# Patient Record
Sex: FEMALE | Race: WHITE | Employment: PART TIME | URBAN - METROPOLITAN AREA
[De-identification: names, ages, dates, MRNs, and addresses within clinical notes are randomized per-mention and may not be internally consistent; named-entity substitution may affect disease eponyms.]

---

## 2018-12-14 DIAGNOSIS — R79.89 TSH ELEVATION: ICD-10-CM

## 2018-12-17 ENCOUNTER — TELEPHONE (OUTPATIENT)
Dept: INTERNAL MEDICINE | Age: 33
End: 2018-12-17

## 2018-12-17 DIAGNOSIS — E03.9 HYPOTHYROIDISM, UNSPECIFIED TYPE: Primary | ICD-10-CM

## 2018-12-17 RX ORDER — LEVOTHYROXINE SODIUM 25 MCG
25 TABLET ORAL DAILY
Qty: 90 TABLET | Refills: 0 | Status: SHIPPED | OUTPATIENT
Start: 2018-12-17 | End: 2019-03-17 | Stop reason: SDUPTHER

## 2019-01-04 ENCOUNTER — HOSPITAL ENCOUNTER (OUTPATIENT)
Age: 34
Discharge: HOME | End: 2019-01-04
Payer: COMMERCIAL

## 2019-01-04 DIAGNOSIS — E03.9 HYPOTHYROIDISM, UNSPECIFIED TYPE: ICD-10-CM

## 2019-01-04 LAB — TSH SERPL DL<=0.05 MIU/L-ACNC: 2.6 UIU/ML (ref 0.27–4.2)

## 2019-01-04 PROCEDURE — 84443 ASSAY THYROID STIM HORMONE: CPT

## 2019-01-04 PROCEDURE — 36415 COLL VENOUS BLD VENIPUNCTURE: CPT

## 2019-02-28 LAB — PAP SMEAR: NORMAL

## 2019-03-17 DIAGNOSIS — R79.89 TSH ELEVATION: ICD-10-CM

## 2019-03-19 RX ORDER — LEVOTHYROXINE SODIUM 25 MCG
TABLET ORAL
Qty: 14 TABLET | Refills: 0 | Status: SHIPPED | OUTPATIENT
Start: 2019-03-19 | End: 2019-04-01 | Stop reason: SDUPTHER

## 2019-04-01 ENCOUNTER — OFFICE VISIT (OUTPATIENT)
Dept: INTERNAL MEDICINE | Age: 34
End: 2019-04-01
Payer: COMMERCIAL

## 2019-04-01 VITALS
WEIGHT: 168 LBS | BODY MASS INDEX: 25.46 KG/M2 | DIASTOLIC BLOOD PRESSURE: 74 MMHG | SYSTOLIC BLOOD PRESSURE: 116 MMHG | RESPIRATION RATE: 12 BRPM | HEIGHT: 68 IN | TEMPERATURE: 97.4 F | HEART RATE: 106 BPM

## 2019-04-01 DIAGNOSIS — E03.9 HYPOTHYROIDISM, UNSPECIFIED TYPE: Primary | ICD-10-CM

## 2019-04-01 PROCEDURE — 99213 OFFICE O/P EST LOW 20 MIN: CPT | Performed by: INTERNAL MEDICINE

## 2019-04-01 PROCEDURE — 1036F TOBACCO NON-USER: CPT | Performed by: INTERNAL MEDICINE

## 2019-04-01 PROCEDURE — G8427 DOCREV CUR MEDS BY ELIG CLIN: HCPCS | Performed by: INTERNAL MEDICINE

## 2019-04-01 PROCEDURE — 99212 OFFICE O/P EST SF 10 MIN: CPT | Performed by: INTERNAL MEDICINE

## 2019-04-01 PROCEDURE — G8419 CALC BMI OUT NRM PARAM NOF/U: HCPCS | Performed by: INTERNAL MEDICINE

## 2019-04-01 RX ORDER — LEVOTHYROXINE SODIUM 25 MCG
TABLET ORAL
Qty: 90 TABLET | Refills: 3 | Status: SHIPPED
Start: 2019-04-01 | End: 2020-03-16 | Stop reason: SDUPTHER

## 2019-04-01 ASSESSMENT — ENCOUNTER SYMPTOMS
TROUBLE SWALLOWING: 0
SHORTNESS OF BREATH: 0
SORE THROAT: 0
NAUSEA: 0
VOMITING: 0
RHINORRHEA: 0
DIARRHEA: 0
CONSTIPATION: 0
EYE ITCHING: 1
CHEST TIGHTNESS: 0
ABDOMINAL PAIN: 0

## 2019-04-01 NOTE — PATIENT INSTRUCTIONS
Follow-up in 1 year and as needed. Patient Education        Learning About Sleeping Well  What does sleeping well mean? Sleeping well means getting enough sleep. How much sleep is enough varies among people. The number of hours you sleep is not as important as how you feel when you wake up. If you do not feel refreshed, you probably need more sleep. Another sign of not getting enough sleep is feeling tired during the day. The average total nightly sleep time is 7½ to 8 hours. Healthy adults may need a little more or a little less than this. Why is getting enough sleep important? Getting enough quality sleep is a basic part of good health. When your sleep suffers, your mood and your thoughts can suffer too. You may find yourself feeling more grumpy or stressed. Not getting enough sleep also can lead to serious problems, including injury, accidents, anxiety, and depression. What might cause poor sleeping? Many things can cause sleep problems, including:  · Stress. Stress can be caused by fear about a single event, such as giving a speech. Or you may have ongoing stress, such as worry about work or school. · Depression, anxiety, and other mental or emotional conditions. · Changes in your sleep habits or surroundings. This includes changes that happen where you sleep, such as noise, light, or sleeping in a different bed. It also includes changes in your sleep pattern, such as having jet lag or working a late shift. · Health problems, such as pain, breathing problems, and restless legs syndrome. · Lack of regular exercise. How can you help yourself? Here are some tips that may help you sleep more soundly and wake up feeling more refreshed. Your sleeping area  · Use your bedroom only for sleeping and sex. A bit of light reading may help you fall asleep. But if it doesn't, do your reading elsewhere in the house. Don't watch TV in bed.   · Be sure your bed is big enough to stretch out comfortably, especially if you have a sleep partner. · Keep your bedroom quiet, dark, and cool. Use curtains, blinds, or a sleep mask to block out light. To block out noise, use earplugs, soothing music, or a \"white noise\" machine. Your evening and bedtime routine  · Create a relaxing bedtime routine. You might want to take a warm shower or bath, listen to soothing music, or drink a cup of noncaffeinated tea. · Go to bed at the same time every night. And get up at the same time every morning, even if you feel tired. What to avoid  · Limit caffeine (coffee, tea, caffeinated sodas) during the day, and don't have any for at least 4 to 6 hours before bedtime. · Don't drink alcohol before bedtime. Alcohol can cause you to wake up more often during the night. · Don't smoke or use tobacco, especially in the evening. Nicotine can keep you awake. · Don't take naps during the day, especially close to bedtime. · Don't lie in bed awake for too long. If you can't fall asleep, or if you wake up in the middle of the night and can't get back to sleep within 15 minutes or so, get out of bed and go to another room until you feel sleepy. · Don't take medicine right before bed that may keep you awake or make you feel hyper or energized. Your doctor can tell you if your medicine may do this and if you can take it earlier in the day. If you can't sleep  · Imagine yourself in a peaceful, pleasant scene. Focus on the details and feelings of being in a place that is relaxing. · Get up and do a quiet or boring activity until you feel sleepy. · Don't drink any liquids after 6 p.m. if you wake up often because you have to go to the bathroom. Where can you learn more? Go to https://Financial Fairy Talescassie.Majitek. org and sign in to your [employer] account. Enter G587 in the Lamsa box to learn more about \"Learning About Sleeping Well. \"     If you do not have an account, please click on the \"Sign Up Now\" link.   Current as of: September 11, 2018  Content Version: 11.9  © 0788-2464 [employer]. Care instructions adapted under license by [facility] ([facility]). If you have questions about a medical condition or this instruction, always ask your healthcare professional. [address] any warranty or liability for your use of this information.

## 2020-03-16 RX ORDER — LEVOTHYROXINE SODIUM 25 MCG
TABLET ORAL
Qty: 90 TABLET | Refills: 3 | Status: SHIPPED
Start: 2020-03-16 | End: 2021-03-11

## 2020-03-17 ENCOUNTER — TELEPHONE (OUTPATIENT)
Dept: INTERNAL MEDICINE | Age: 35
End: 2020-03-17

## 2020-10-20 ENCOUNTER — OFFICE VISIT (OUTPATIENT)
Dept: INTERNAL MEDICINE | Age: 35
End: 2020-10-20
Payer: COMMERCIAL

## 2020-10-20 VITALS
RESPIRATION RATE: 12 BRPM | BODY MASS INDEX: 22.43 KG/M2 | WEIGHT: 148 LBS | HEART RATE: 104 BPM | SYSTOLIC BLOOD PRESSURE: 116 MMHG | HEIGHT: 68 IN | TEMPERATURE: 98 F | DIASTOLIC BLOOD PRESSURE: 77 MMHG

## 2020-10-20 PROCEDURE — 6360000002 HC RX W HCPCS

## 2020-10-20 PROCEDURE — G8420 CALC BMI NORM PARAMETERS: HCPCS | Performed by: INTERNAL MEDICINE

## 2020-10-20 PROCEDURE — G8482 FLU IMMUNIZE ORDER/ADMIN: HCPCS | Performed by: INTERNAL MEDICINE

## 2020-10-20 PROCEDURE — G0008 ADMIN INFLUENZA VIRUS VAC: HCPCS

## 2020-10-20 PROCEDURE — G8427 DOCREV CUR MEDS BY ELIG CLIN: HCPCS | Performed by: INTERNAL MEDICINE

## 2020-10-20 PROCEDURE — 90686 IIV4 VACC NO PRSV 0.5 ML IM: CPT

## 2020-10-20 PROCEDURE — 1036F TOBACCO NON-USER: CPT | Performed by: INTERNAL MEDICINE

## 2020-10-20 PROCEDURE — 99212 OFFICE O/P EST SF 10 MIN: CPT | Performed by: INTERNAL MEDICINE

## 2020-10-20 PROCEDURE — 99213 OFFICE O/P EST LOW 20 MIN: CPT | Performed by: INTERNAL MEDICINE

## 2020-10-20 RX ORDER — MULTIVIT-MIN/IRON/FOLIC ACID/K 18-600-40
CAPSULE ORAL
COMMUNITY

## 2020-10-20 RX ORDER — LORATADINE 10 MG/1
10 TABLET ORAL DAILY
COMMUNITY

## 2020-10-20 SDOH — ECONOMIC STABILITY: FOOD INSECURITY: WITHIN THE PAST 12 MONTHS, THE FOOD YOU BOUGHT JUST DIDN'T LAST AND YOU DIDN'T HAVE MONEY TO GET MORE.: NEVER TRUE

## 2020-10-20 SDOH — ECONOMIC STABILITY: TRANSPORTATION INSECURITY
IN THE PAST 12 MONTHS, HAS THE LACK OF TRANSPORTATION KEPT YOU FROM MEDICAL APPOINTMENTS OR FROM GETTING MEDICATIONS?: YES

## 2020-10-20 SDOH — ECONOMIC STABILITY: TRANSPORTATION INSECURITY
IN THE PAST 12 MONTHS, HAS LACK OF TRANSPORTATION KEPT YOU FROM MEETINGS, WORK, OR FROM GETTING THINGS NEEDED FOR DAILY LIVING?: YES

## 2020-10-20 SDOH — ECONOMIC STABILITY: FOOD INSECURITY: WITHIN THE PAST 12 MONTHS, YOU WORRIED THAT YOUR FOOD WOULD RUN OUT BEFORE YOU GOT MONEY TO BUY MORE.: NEVER TRUE

## 2020-10-20 SDOH — ECONOMIC STABILITY: INCOME INSECURITY: HOW HARD IS IT FOR YOU TO PAY FOR THE VERY BASICS LIKE FOOD, HOUSING, MEDICAL CARE, AND HEATING?: NOT HARD AT ALL

## 2020-10-20 ASSESSMENT — ENCOUNTER SYMPTOMS
SINUS PRESSURE: 0
SORE THROAT: 0
ABDOMINAL PAIN: 0
CONSTIPATION: 0
COUGH: 0
TROUBLE SWALLOWING: 0
VOMITING: 0
CHEST TIGHTNESS: 0
SHORTNESS OF BREATH: 0
BLOOD IN STOOL: 0
DIARRHEA: 0
SINUS PAIN: 0
NAUSEA: 0

## 2020-10-20 ASSESSMENT — PATIENT HEALTH QUESTIONNAIRE - PHQ9
SUM OF ALL RESPONSES TO PHQ QUESTIONS 1-9: 0
SUM OF ALL RESPONSES TO PHQ QUESTIONS 1-9: 0
SUM OF ALL RESPONSES TO PHQ9 QUESTIONS 1 & 2: 0
SUM OF ALL RESPONSES TO PHQ QUESTIONS 1-9: 0
2. FEELING DOWN, DEPRESSED OR HOPELESS: 0
1. LITTLE INTEREST OR PLEASURE IN DOING THINGS: 0

## 2020-10-20 NOTE — PATIENT INSTRUCTIONS
Patient Education        Learning About Calcium  What is calcium? Calcium keeps your bones and muscles--including your heart--healthy and strong. Your body needs vitamin D to absorb calcium. People who don't get enough calcium and vitamin D throughout life have an increased chance of having thin and brittle bones (osteoporosis) in their later years. Thin and brittle bones break easily. They can lead to serious injuries. This is why it's important for you to get enough calcium and vitamin D as a child and as an adult. It helps keep your bones strong as you get older. And it protects you against possible breaks. Your body also uses vitamin D to help your muscles absorb calcium and work well. If your muscles don't get enough calcium, then they can cramp, hurt, or feel weak. You may have long-term (chronic) muscle aches and pains. How much calcium do you need? How much calcium you need each day changes as you age. The [place] of Medicine recommends the following amounts of calcium each day. · Ages 1 to 3 years: 700 milligrams  · Ages 4 to 8 years: 1,000 milligrams  · Ages 5 to 25 years: 1,300 milligrams  · Ages 23 to 48 years: 1,000 milligrams  · Males 46 to 79 years: 1,000 milligrams  · Females 46 to 79 years: 1,200 milligrams  · Ages 70 and older: 1,200 milligrams  Women who are pregnant or breastfeeding need the same amount of calcium and vitamin D as other women their age. How can you get enough calcium? Calcium is in foods such as milk, cheese, and yogurt. Vegetables like broccoli, kale, and Chinese cabbage also have it. You can get calcium if you eat the soft edible bones in canned sardines and canned salmon. Foods with added (fortified) calcium include some cereals, juices, soy drinks, and tofu. The food label will show how much of it was added. You can figure out how much calcium is in a food by looking at the percent daily value section on the nutrition facts label.  The food label assumes the daily value of calcium is 1,000 mg. So if one serving of a food has a daily value of 20% of calcium, that food has 200 mg of calcium in one serving. Some people who don't get enough calcium may need supplements. Two common calcium supplements are calcium citrate and calcium carbonate. Calcium carbonate is best absorbed when it is taken with food. Calcium citrate can be absorbed well with or without food. Spreading calcium out over the course of the day can reduce stomach upset. And your body absorbs it better when it is spread over the day. Try not to take more than 500 mg of calcium supplement at a time. Where can you learn more? Go to https://E Inkpepiceweb.Yattos. org and sign in to your [employer] account. Enter S264 in the Lingvist box to learn more about \"Learning About Calcium. \"     If you do not have an account, please click on the \"Sign Up Now\" link. Current as of: August 22, 2019               Content Version: 12.6  © 2006-2020 [employer]. Care instructions adapted under license by [facility] ([facility]). If you have questions about a medical condition or this instruction, always ask your healthcare professional. [address] any warranty or liability for your use of this information. Patient Education        Learning About Calcium  What is calcium? Calcium keeps your bones and muscles--including your heart--healthy and strong. Your body needs vitamin D to absorb calcium. People who don't get enough calcium and vitamin D throughout life have an increased chance of having thin and brittle bones (osteoporosis) in their later years. Thin and brittle bones break easily. They can lead to serious injuries. This is why it's important for you to get enough calcium and vitamin D as a child and as an adult. It helps keep your bones strong as you get older. And it protects you against possible breaks.   Your body also uses vitamin D to help your muscles absorb calcium and work well. If your muscles don't get enough calcium, then they can cramp, hurt, or feel weak. You may have long-term (chronic) muscle aches and pains. How much calcium do you need? How much calcium you need each day changes as you age. The [place] of Medicine recommends the following amounts of calcium each day. · Ages 1 to 3 years: 700 milligrams  · Ages 4 to 8 years: 1,000 milligrams  · Ages 5 to 25 years: 1,300 milligrams  · Ages 23 to 48 years: 1,000 milligrams  · Males 46 to 79 years: 1,000 milligrams  · Females 46 to 79 years: 1,200 milligrams  · Ages 70 and older: 1,200 milligrams  Women who are pregnant or breastfeeding need the same amount of calcium and vitamin D as other women their age. How can you get enough calcium? Calcium is in foods such as milk, cheese, and yogurt. Vegetables like broccoli, kale, and Chinese cabbage also have it. You can get calcium if you eat the soft edible bones in canned sardines and canned salmon. Foods with added (fortified) calcium include some cereals, juices, soy drinks, and tofu. The food label will show how much of it was added. You can figure out how much calcium is in a food by looking at the percent daily value section on the nutrition facts label. The food label assumes the daily value of calcium is 1,000 mg. So if one serving of a food has a daily value of 20% of calcium, that food has 200 mg of calcium in one serving. Some people who don't get enough calcium may need supplements. Two common calcium supplements are calcium citrate and calcium carbonate. Calcium carbonate is best absorbed when it is taken with food. Calcium citrate can be absorbed well with or without food. Spreading calcium out over the course of the day can reduce stomach upset. And your body absorbs it better when it is spread over the day. Try not to take more than 500 mg of calcium supplement at a time. Where can you learn more?   Go to https://[email or web address].[email or web address] [email or web address] org and sign in to your [employer] account. Enter S264 in the madvertise box to learn more about \"Learning About Calcium. \"     If you do not have an account, please click on the \"Sign Up Now\" link. Current as of: August 22, 2019               Content Version: 12.6  © 2635-4291 [employer]. Care instructions adapted under license by [facility] ([facility]). If you have questions about a medical condition or this instruction, always ask your healthcare professional. [address] any warranty or liability for your use of this information.

## 2020-10-20 NOTE — PROGRESS NOTES
Discharge instructions reviewed with patient. Patient verbalizes understanding. AVS given. Julia Hirsch
achieve 1500 mg daily and 800 IU of Vitamin D. Health maintenance -   -     INFLUENZA, QUADV, 3 YRS AND OLDER, IM PF, PREFILL SYR OR SDV, 0.5ML (AFLURIA QUADV, PF)    Need pap exam record - Dr. Danie Childs.       Tato Miles MD  10/21/2020

## 2020-10-27 ENCOUNTER — HOSPITAL ENCOUNTER (OUTPATIENT)
Age: 35
Discharge: HOME | End: 2020-10-29
Payer: COMMERCIAL

## 2020-10-27 LAB
TSH SERPL DL<=0.05 MIU/L-ACNC: 3.31 UIU/ML (ref 0.27–4.2)
VITAMIN D 25-HYDROXY: 32 NG/ML (ref 30–100)

## 2020-10-27 PROCEDURE — 84443 ASSAY THYROID STIM HORMONE: CPT

## 2020-10-27 PROCEDURE — 36415 COLL VENOUS BLD VENIPUNCTURE: CPT

## 2020-10-27 PROCEDURE — 82306 VITAMIN D 25 HYDROXY: CPT

## 2021-01-30 ENCOUNTER — IMMUNIZATION (OUTPATIENT)
Dept: PRIMARY CARE CLINIC | Age: 36
End: 2021-01-30
Payer: COMMERCIAL

## 2021-01-30 DIAGNOSIS — Z23 NEED FOR PROPHYLACTIC VACCINATION AND INOCULATION AGAINST CHOLERA ALONE: Primary | ICD-10-CM

## 2021-01-30 PROCEDURE — 0001A COVID-19, PFIZER VACCINE 30MCG/0.3ML DOSE: CPT | Performed by: NURSE PRACTITIONER

## 2021-01-30 PROCEDURE — 91300 COVID-19, PFIZER VACCINE 30MCG/0.3ML DOSE: CPT | Performed by: NURSE PRACTITIONER

## 2021-02-22 ENCOUNTER — IMMUNIZATION (OUTPATIENT)
Dept: PRIMARY CARE CLINIC | Age: 36
End: 2021-02-22
Payer: COMMERCIAL

## 2021-02-22 PROCEDURE — 0002A COVID-19, PFIZER VACCINE 30MCG/0.3ML DOSE: CPT | Performed by: NURSE PRACTITIONER

## 2021-02-22 PROCEDURE — 91300 COVID-19, PFIZER VACCINE 30MCG/0.3ML DOSE: CPT | Performed by: NURSE PRACTITIONER

## 2021-03-11 DIAGNOSIS — E03.9 HYPOTHYROIDISM, UNSPECIFIED TYPE: ICD-10-CM

## 2021-03-11 RX ORDER — LEVOTHYROXINE SODIUM 25 MCG
TABLET ORAL
Qty: 90 TABLET | Refills: 3 | Status: SHIPPED
Start: 2021-03-11 | End: 2022-03-07

## 2022-03-07 DIAGNOSIS — E03.9 HYPOTHYROIDISM, UNSPECIFIED TYPE: ICD-10-CM

## 2022-03-07 RX ORDER — LEVOTHYROXINE SODIUM 25 MCG
TABLET ORAL
Qty: 90 TABLET | Refills: 3 | Status: SHIPPED
Start: 2022-03-07 | End: 2022-03-18 | Stop reason: CLARIF

## 2022-03-18 ENCOUNTER — TELEPHONE (OUTPATIENT)
Dept: INTERNAL MEDICINE | Age: 37
End: 2022-03-18

## 2022-03-18 RX ORDER — LEVOTHYROXINE SODIUM 0.03 MG/1
25 TABLET ORAL DAILY
Qty: 90 TABLET | Refills: 1 | Status: SHIPPED
Start: 2022-03-18 | End: 2022-09-19 | Stop reason: SDUPTHER

## 2022-09-02 RX ORDER — LEVOTHYROXINE SODIUM 0.03 MG/1
TABLET ORAL
Qty: 90 TABLET | Refills: 3 | OUTPATIENT
Start: 2022-09-02

## 2022-09-09 NOTE — PROGRESS NOTES
Care gaps reviewed with patient.  The following information was ascertained:  Cervical Cancer Screenin19  Breast Cancer Screening: age 34  Colon Cancer Screening: age 37  Annual Wellness Visit: NA  Lung Cancer Screening: NA  DEXA: NA  Diabetic Foot Exam: NA  Eye Exam:   Dental Exam:   Labs: Alejandrina, HIV, Hep C  TDaP: 14  Pneumonia: NA  Shingrix: age 46  Covid: booster #1 due  Influenza: due  Hep A: NA  Hep B: NA  Edwar Pradhan LPN

## 2022-09-14 ENCOUNTER — OFFICE VISIT (OUTPATIENT)
Dept: INTERNAL MEDICINE | Age: 37
End: 2022-09-14
Payer: COMMERCIAL

## 2022-09-14 VITALS
WEIGHT: 157 LBS | BODY MASS INDEX: 23.79 KG/M2 | DIASTOLIC BLOOD PRESSURE: 68 MMHG | HEART RATE: 61 BPM | HEIGHT: 68 IN | SYSTOLIC BLOOD PRESSURE: 103 MMHG | RESPIRATION RATE: 18 BRPM | OXYGEN SATURATION: 100 % | TEMPERATURE: 97.3 F

## 2022-09-14 DIAGNOSIS — Z91.89 ENCOUNTER FOR HCV SCREENING TEST FOR HIGH RISK PATIENT: ICD-10-CM

## 2022-09-14 DIAGNOSIS — Z13.0 SCREENING FOR ENDOCRINE, METABOLIC AND IMMUNITY DISORDER: ICD-10-CM

## 2022-09-14 DIAGNOSIS — E03.9 HYPOTHYROIDISM, UNSPECIFIED TYPE: Primary | ICD-10-CM

## 2022-09-14 DIAGNOSIS — Z13.29 SCREENING FOR ENDOCRINE, METABOLIC AND IMMUNITY DISORDER: ICD-10-CM

## 2022-09-14 DIAGNOSIS — Z13.220 LIPID SCREENING: ICD-10-CM

## 2022-09-14 DIAGNOSIS — Z11.59 ENCOUNTER FOR HCV SCREENING TEST FOR HIGH RISK PATIENT: ICD-10-CM

## 2022-09-14 DIAGNOSIS — Z13.228 SCREENING FOR ENDOCRINE, METABOLIC AND IMMUNITY DISORDER: ICD-10-CM

## 2022-09-14 DIAGNOSIS — Z23 INFLUENZA VACCINE NEEDED: ICD-10-CM

## 2022-09-14 DIAGNOSIS — Z11.4 SCREENING FOR HUMAN IMMUNODEFICIENCY VIRUS: ICD-10-CM

## 2022-09-14 PROCEDURE — 99213 OFFICE O/P EST LOW 20 MIN: CPT | Performed by: INTERNAL MEDICINE

## 2022-09-14 SDOH — ECONOMIC STABILITY: HOUSING INSECURITY: IN THE LAST 12 MONTHS, HOW MANY PLACES HAVE YOU LIVED?: 1

## 2022-09-14 SDOH — ECONOMIC STABILITY: HOUSING INSECURITY
IN THE LAST 12 MONTHS, WAS THERE A TIME WHEN YOU DID NOT HAVE A STEADY PLACE TO SLEEP OR SLEPT IN A SHELTER (INCLUDING NOW)?: NO

## 2022-09-14 SDOH — ECONOMIC STABILITY: TRANSPORTATION INSECURITY
IN THE PAST 12 MONTHS, HAS THE LACK OF TRANSPORTATION KEPT YOU FROM MEDICAL APPOINTMENTS OR FROM GETTING MEDICATIONS?: NO

## 2022-09-14 SDOH — ECONOMIC STABILITY: TRANSPORTATION INSECURITY
IN THE PAST 12 MONTHS, HAS LACK OF TRANSPORTATION KEPT YOU FROM MEETINGS, WORK, OR FROM GETTING THINGS NEEDED FOR DAILY LIVING?: NO

## 2022-09-14 SDOH — ECONOMIC STABILITY: FOOD INSECURITY: WITHIN THE PAST 12 MONTHS, THE FOOD YOU BOUGHT JUST DIDN'T LAST AND YOU DIDN'T HAVE MONEY TO GET MORE.: NEVER TRUE

## 2022-09-14 SDOH — ECONOMIC STABILITY: INCOME INSECURITY: IN THE LAST 12 MONTHS, WAS THERE A TIME WHEN YOU WERE NOT ABLE TO PAY THE MORTGAGE OR RENT ON TIME?: NO

## 2022-09-14 SDOH — ECONOMIC STABILITY: FOOD INSECURITY: WITHIN THE PAST 12 MONTHS, YOU WORRIED THAT YOUR FOOD WOULD RUN OUT BEFORE YOU GOT MONEY TO BUY MORE.: NEVER TRUE

## 2022-09-14 ASSESSMENT — PATIENT HEALTH QUESTIONNAIRE - PHQ9
SUM OF ALL RESPONSES TO PHQ QUESTIONS 1-9: 0
SUM OF ALL RESPONSES TO PHQ QUESTIONS 1-9: 0
2. FEELING DOWN, DEPRESSED OR HOPELESS: 0
SUM OF ALL RESPONSES TO PHQ9 QUESTIONS 1 & 2: 0
1. LITTLE INTEREST OR PLEASURE IN DOING THINGS: NOT AT ALL
SUM OF ALL RESPONSES TO PHQ QUESTIONS 1-9: 0
DEPRESSION UNABLE TO ASSESS: YES
2. FEELING DOWN, DEPRESSED OR HOPELESS: NOT AT ALL
1. LITTLE INTEREST OR PLEASURE IN DOING THINGS: 0
SUM OF ALL RESPONSES TO PHQ QUESTIONS 1-9: 0
SUM OF ALL RESPONSES TO PHQ9 QUESTIONS 1 & 2: 0

## 2022-09-14 ASSESSMENT — SOCIAL DETERMINANTS OF HEALTH (SDOH): HOW HARD IS IT FOR YOU TO PAY FOR THE VERY BASICS LIKE FOOD, HOUSING, MEDICAL CARE, AND HEATING?: NOT HARD AT ALL

## 2022-09-14 ASSESSMENT — LIFESTYLE VARIABLES
HOW MANY STANDARD DRINKS CONTAINING ALCOHOL DO YOU HAVE ON A TYPICAL DAY: 1 OR 2
HOW OFTEN DO YOU HAVE A DRINK CONTAINING ALCOHOL: MONTHLY OR LESS

## 2022-09-14 NOTE — PROGRESS NOTES
[facility] Internal Medicine      SUBJECTIVE:  Gabriela Dill (:  1985) is a 43 y.o. female here for evaluation of the following chief complaint(s):  Hypothyroidism  Hypothyroidism - on levothyroxine, 25 mcg daily. Feeling well without any temperature intolerance. Check TSH     Allergic rhinitis - taking claritin seasonally. Symptoms usually worse in the spring. Continue claritin on a PRN basis. Migraine headache - No headaches for over 2 years. Monitor for any recurrence. Health maintenance - lipid panel ordered. HIV/Hep C and Varicella titers drawn as well. Review of Systems as above    Current Outpatient Medications on File Prior to Visit   Medication Sig Dispense Refill    levothyroxine (SYNTHROID) 25 MCG tablet Take 1 tablet by mouth daily 90 tablet 1    loratadine (CLARITIN) 10 MG tablet Take 10 mg by mouth daily      Cholecalciferol (VITAMIN D) 50 MCG ( UT) CAPS capsule Take by mouth      loratadine-pseudoephedrine (CLARITIN-D 24 HOUR)  MG per tablet Take 1 tablet by mouth daily. No current facility-administered medications on file prior to visit. OBJECTIVE:    VS:   Vitals:    22 1319   BP: 103/68   Site: Left Upper Arm   Position: Sitting   Cuff Size: Medium Adult   Pulse: 61   Resp: 18   Temp: 97.3 °F (36.3 °C)   TempSrc: Infrared   SpO2: 100%   Weight: 157 lb (71.2 kg)   Height: 5' 8\" (1.727 m)     Physical Exam   HEENT:  PERRL, EOMI, Mouth without erythema or exudate. TMs clear B. Lungs:  CTA B, no vertebral column tenderness to palpation, no flank tenderness to percussion of flanks B. Neck:   No carotid bruits appreciated B. No LAD appreciated. No thyroid nodules or masses appreciated. CVS:  +s1/s2 without m/g/r appreciated. Abd:  + BS, NTND, No renal or aortic bruits, No hepatosplenomegaly appreciated.     Extr:  2+ DP/PT pulses B, no pitting edema      RTC:  Return in about 1 year (around

## 2022-09-15 DIAGNOSIS — Z13.220 LIPID SCREENING: ICD-10-CM

## 2022-09-15 DIAGNOSIS — Z11.4 SCREENING FOR HUMAN IMMUNODEFICIENCY VIRUS: ICD-10-CM

## 2022-09-15 DIAGNOSIS — E03.9 HYPOTHYROIDISM, UNSPECIFIED TYPE: ICD-10-CM

## 2022-09-15 DIAGNOSIS — Z91.89 ENCOUNTER FOR HCV SCREENING TEST FOR HIGH RISK PATIENT: ICD-10-CM

## 2022-09-15 DIAGNOSIS — Z13.29 SCREENING FOR ENDOCRINE, METABOLIC AND IMMUNITY DISORDER: ICD-10-CM

## 2022-09-15 DIAGNOSIS — Z11.59 ENCOUNTER FOR HCV SCREENING TEST FOR HIGH RISK PATIENT: ICD-10-CM

## 2022-09-15 DIAGNOSIS — Z13.228 SCREENING FOR ENDOCRINE, METABOLIC AND IMMUNITY DISORDER: ICD-10-CM

## 2022-09-15 DIAGNOSIS — Z13.0 SCREENING FOR ENDOCRINE, METABOLIC AND IMMUNITY DISORDER: ICD-10-CM

## 2022-09-15 LAB
CHOLESTEROL, TOTAL: 181 MG/DL (ref 0–199)
HDLC SERPL-MCNC: 61 MG/DL
LDL CHOLESTEROL CALCULATED: 99 MG/DL (ref 0–99)
TRIGL SERPL-MCNC: 103 MG/DL (ref 0–149)
TSH SERPL DL<=0.05 MIU/L-ACNC: 3.27 UIU/ML (ref 0.27–4.2)
VLDLC SERPL CALC-MCNC: 21 MG/DL

## 2022-09-18 LAB
HEPATITIS C ANTIBODY INTERPRETATION: NORMAL
HIV-1 AND HIV-2 ANTIBODIES: NORMAL

## 2022-09-19 LAB — VARICELLA-ZOSTER VIRUS AB, IGG: NORMAL

## 2022-09-19 RX ORDER — LEVOTHYROXINE SODIUM 0.03 MG/1
25 TABLET ORAL DAILY
Qty: 90 TABLET | Refills: 1 | Status: SHIPPED | OUTPATIENT
Start: 2022-09-19 | End: 2023-03-18

## 2023-03-01 RX ORDER — LEVOTHYROXINE SODIUM 0.03 MG/1
TABLET ORAL
Qty: 90 TABLET | Refills: 1 | Status: SHIPPED | OUTPATIENT
Start: 2023-03-01

## 2023-05-03 ENCOUNTER — OFFICE VISIT (OUTPATIENT)
Dept: INTERNAL MEDICINE | Age: 38
End: 2023-05-03
Payer: COMMERCIAL

## 2023-05-03 VITALS
BODY MASS INDEX: 23.76 KG/M2 | DIASTOLIC BLOOD PRESSURE: 76 MMHG | WEIGHT: 156.8 LBS | OXYGEN SATURATION: 96 % | HEART RATE: 88 BPM | SYSTOLIC BLOOD PRESSURE: 121 MMHG | HEIGHT: 68 IN | TEMPERATURE: 97.3 F | RESPIRATION RATE: 16 BRPM

## 2023-05-03 DIAGNOSIS — R19.7 DIARRHEA, UNSPECIFIED TYPE: ICD-10-CM

## 2023-05-03 DIAGNOSIS — R19.7 DIARRHEA, UNSPECIFIED TYPE: Primary | ICD-10-CM

## 2023-05-03 LAB
ALBUMIN SERPL-MCNC: 4.3 G/DL (ref 3.5–5.2)
ALP SERPL-CCNC: 54 U/L (ref 35–104)
ALT SERPL-CCNC: 20 U/L (ref 0–32)
ANION GAP SERPL CALCULATED.3IONS-SCNC: 13 MMOL/L (ref 7–16)
AST SERPL-CCNC: 24 U/L (ref 0–31)
BILIRUB SERPL-MCNC: <0.2 MG/DL (ref 0–1.2)
BUN SERPL-MCNC: 12 MG/DL (ref 6–20)
CALCIUM SERPL-MCNC: 9.5 MG/DL (ref 8.6–10.2)
CHLORIDE SERPL-SCNC: 108 MMOL/L (ref 98–107)
CO2 SERPL-SCNC: 22 MMOL/L (ref 22–29)
CREAT SERPL-MCNC: 0.7 MG/DL (ref 0.5–1)
GLUCOSE SERPL-MCNC: 89 MG/DL (ref 74–99)
POTASSIUM SERPL-SCNC: 4.2 MMOL/L (ref 3.5–5)
PROT SERPL-MCNC: 7.6 G/DL (ref 6.4–8.3)
SODIUM SERPL-SCNC: 143 MMOL/L (ref 132–146)
T4 FREE SERPL-MCNC: 1.32 NG/DL (ref 0.93–1.7)
TSH SERPL-MCNC: 1.5 UIU/ML (ref 0.27–4.2)

## 2023-05-03 PROCEDURE — 99212 OFFICE O/P EST SF 10 MIN: CPT | Performed by: INTERNAL MEDICINE

## 2023-05-03 PROCEDURE — 36415 COLL VENOUS BLD VENIPUNCTURE: CPT | Performed by: INTERNAL MEDICINE

## 2023-05-03 PROCEDURE — 99213 OFFICE O/P EST LOW 20 MIN: CPT | Performed by: INTERNAL MEDICINE

## 2023-05-03 NOTE — PROGRESS NOTES
[facility] Internal Medicine      SUBJECTIVE:  Pardeep Corley (:  1985) is a 42 y.o. female here for evaluation of the following chief complaint(s):  Diarrhea (Onset x 1 week ago  at times will have abd cramping between and after meals at times  denies nausea /vomiting)  Diarrhea - 10 days after a visit with young niece and nephew. Tried Pepto without relief. Then tried Friday immodium and this helped. Saturday afternoon had recurrence, nothing on . Monday, had return of stools. Sticking with a bland diet. Has a good appetite. Some cramping. No fevers or chills. Still with gallbladder. Started a new supplement - Mixers - iron/Mg. Started this 2 months ago. This helped her feel better. Uncertain if this is contributing to her symptoms. Check CMP, CBC, TSH, free T4 to see if this is contributing. Will research ingredients of this supplement to see if this is causative to these symptoms. Tru Schuster reports that she has been having increased stressors at work and has begun to feel that she is more anxious and down. Uncertain if she would like to start on medication therapy. Information given to Tru Schuster about escitalopram.    Tru Schuster to consider this information and decide on whether she would like to begin this medication therapy. Review of Systems as above. Current Outpatient Medications on File Prior to Visit   Medication Sig Dispense Refill    levothyroxine (SYNTHROID) 25 MCG tablet TAKE 1 TABLET DAILY 90 tablet 1    loratadine (CLARITIN) 10 MG tablet Take 1 tablet by mouth daily      Cholecalciferol (VITAMIN D) 50 MCG (2000) CAPS capsule Take by mouth       No current facility-administered medications on file prior to visit.        OBJECTIVE:    VS:   Vitals:    23 1405   BP: 121/76   Site: Right Upper Arm   Position: Sitting   Cuff Size: Large Adult   Pulse: 88   Resp: 16   Temp: 97.3 °F (36.3 °C)   TempSrc: Temporal   SpO2:

## 2023-05-04 ENCOUNTER — TELEPHONE (OUTPATIENT)
Dept: INTERNAL MEDICINE | Age: 38
End: 2023-05-04

## 2023-05-04 NOTE — TELEPHONE ENCOUNTER
BODØ completed the lab draw and did not draw a lavender tube. I will send to patient in mail for her to have drawn.   Fantasma Woods LPN

## 2023-05-19 LAB
BASOPHILS # BLD: 0.05 E9/L (ref 0–0.2)
BASOPHILS NFR BLD: 0.6 % (ref 0–2)
EOSINOPHIL # BLD: 0.26 E9/L (ref 0.05–0.5)
EOSINOPHIL NFR BLD: 3.1 % (ref 0–6)
ERYTHROCYTE [DISTWIDTH] IN BLOOD BY AUTOMATED COUNT: 13.3 FL (ref 11.5–15)
HCT VFR BLD AUTO: 37.9 % (ref 34–48)
HGB BLD-MCNC: 11.9 G/DL (ref 11.5–15.5)
IMM GRANULOCYTES # BLD: 0.03 E9/L
IMM GRANULOCYTES NFR BLD: 0.4 % (ref 0–5)
LYMPHOCYTES # BLD: 1.76 E9/L (ref 1.5–4)
LYMPHOCYTES NFR BLD: 20.8 % (ref 20–42)
MCH RBC QN AUTO: 32.1 PG (ref 26–35)
MCHC RBC AUTO-ENTMCNC: 31.4 % (ref 32–34.5)
MCV RBC AUTO: 102.2 FL (ref 80–99.9)
MONOCYTES # BLD: 0.6 E9/L (ref 0.1–0.95)
MONOCYTES NFR BLD: 7.1 % (ref 2–12)
NEUTROPHILS # BLD: 5.77 E9/L (ref 1.8–7.3)
NEUTS SEG NFR BLD: 68 % (ref 43–80)
PLATELET # BLD AUTO: 248 E9/L (ref 130–450)
PMV BLD AUTO: 11.1 FL (ref 7–12)
RBC # BLD AUTO: 3.71 E12/L (ref 3.5–5.5)
WBC # BLD: 8.5 E9/L (ref 4.5–11.5)

## 2023-05-30 ASSESSMENT — PATIENT HEALTH QUESTIONNAIRE - PHQ9
1. LITTLE INTEREST OR PLEASURE IN DOING THINGS: 0
2. FEELING DOWN, DEPRESSED OR HOPELESS: 0
SUM OF ALL RESPONSES TO PHQ QUESTIONS 1-9: 0
SUM OF ALL RESPONSES TO PHQ9 QUESTIONS 1 & 2: 0
SUM OF ALL RESPONSES TO PHQ9 QUESTIONS 1 & 2: 0
1. LITTLE INTEREST OR PLEASURE IN DOING THINGS: NOT AT ALL
SUM OF ALL RESPONSES TO PHQ QUESTIONS 1-9: 0
SUM OF ALL RESPONSES TO PHQ QUESTIONS 1-9: 0
2. FEELING DOWN, DEPRESSED OR HOPELESS: NOT AT ALL
SUM OF ALL RESPONSES TO PHQ QUESTIONS 1-9: 0

## 2023-05-30 NOTE — PROGRESS NOTES
[facility] Internal Medicine      SUBJECTIVE:  Kim Looney (:  1985) is a 42 y.o. female here for evaluation of the following chief complaint(s):  Thyroid Problem  Diarrhea - has discontinued use of supplement and all symptoms have resolved. Increased anxiety and increased work stressors - discussed use of escitalopram and Palmer Becker was to consider this as a treatment if needed. Palmer Becker states that she is still experiencing anxiety and her TIAGO-7 is 9 indicating mild anxiety based on scoring. Start escitalopram, 5 mg daily    Reassess symptoms in 4 weeks to determine need for increased dose or to stay at current dose. Continue current counseling     Hypothyroidism - normal TSH on 5/3/2023. Continue levothyroxine, 25 mcg daily. Review of Systems    Current Outpatient Medications on File Prior to Visit   Medication Sig Dispense Refill    levothyroxine (SYNTHROID) 25 MCG tablet TAKE 1 TABLET DAILY 90 tablet 1    loratadine (CLARITIN) 10 MG tablet Take 1 tablet by mouth daily      Cholecalciferol (VITAMIN D) 50 MCG (2000 UT) CAPS capsule Take by mouth       No current facility-administered medications on file prior to visit. OBJECTIVE:    VS:   Vitals:    23 0856   BP: 112/73   Site: Left Upper Arm   Position: Sitting   Cuff Size: Medium Adult   Pulse: (!) 104   Resp: 18   Temp: 97.6 °F (36.4 °C)   TempSrc: Temporal   SpO2: 100%   Weight: 158 lb (71.7 kg)   Height: 5' 8\" (1.727 m)     Physical Exam   Lungs:  CTA B  Neck:   No carotid bruits appreciated B.   CVS:  +s1/s2 without m/g/r appreciated. Abd:  + BS, NTND, No renal or aortic bruits   Extr:  2+ DP/PT pulses B, no pitting edema     RTC:  Return in about 4 weeks (around 2023).       Jose Domínguez MD   2023 11:14 AM

## 2023-05-31 ENCOUNTER — OFFICE VISIT (OUTPATIENT)
Dept: INTERNAL MEDICINE | Age: 38
End: 2023-05-31
Payer: COMMERCIAL

## 2023-05-31 VITALS
OXYGEN SATURATION: 100 % | TEMPERATURE: 97.6 F | RESPIRATION RATE: 18 BRPM | HEIGHT: 68 IN | DIASTOLIC BLOOD PRESSURE: 73 MMHG | SYSTOLIC BLOOD PRESSURE: 112 MMHG | HEART RATE: 104 BPM | BODY MASS INDEX: 23.95 KG/M2 | WEIGHT: 158 LBS

## 2023-05-31 DIAGNOSIS — F41.9 ANXIETY: Primary | ICD-10-CM

## 2023-05-31 PROCEDURE — 99213 OFFICE O/P EST LOW 20 MIN: CPT | Performed by: INTERNAL MEDICINE

## 2023-05-31 PROCEDURE — 99212 OFFICE O/P EST SF 10 MIN: CPT | Performed by: INTERNAL MEDICINE

## 2023-05-31 RX ORDER — ESCITALOPRAM OXALATE 5 MG/1
5 TABLET ORAL DAILY
Qty: 30 TABLET | Refills: 1 | Status: SHIPPED | OUTPATIENT
Start: 2023-05-31

## 2023-05-31 ASSESSMENT — PATIENT HEALTH QUESTIONNAIRE - PHQ9
8. MOVING OR SPEAKING SO SLOWLY THAT OTHER PEOPLE COULD HAVE NOTICED. OR THE OPPOSITE, BEING SO FIGETY OR RESTLESS THAT YOU HAVE BEEN MOVING AROUND A LOT MORE THAN USUAL: 0
10. IF YOU CHECKED OFF ANY PROBLEMS, HOW DIFFICULT HAVE THESE PROBLEMS MADE IT FOR YOU TO DO YOUR WORK, TAKE CARE OF THINGS AT HOME, OR GET ALONG WITH OTHER PEOPLE: 1
SUM OF ALL RESPONSES TO PHQ QUESTIONS 1-9: 4
SUM OF ALL RESPONSES TO PHQ9 QUESTIONS 1 & 2: 0
9. THOUGHTS THAT YOU WOULD BE BETTER OFF DEAD, OR OF HURTING YOURSELF: 0
2. FEELING DOWN, DEPRESSED OR HOPELESS: 0
7. TROUBLE CONCENTRATING ON THINGS, SUCH AS READING THE NEWSPAPER OR WATCHING TELEVISION: 0
1. LITTLE INTEREST OR PLEASURE IN DOING THINGS: 0
SUM OF ALL RESPONSES TO PHQ QUESTIONS 1-9: 4
3. TROUBLE FALLING OR STAYING ASLEEP: 1
6. FEELING BAD ABOUT YOURSELF - OR THAT YOU ARE A FAILURE OR HAVE LET YOURSELF OR YOUR FAMILY DOWN: 1
5. POOR APPETITE OR OVEREATING: 1
4. FEELING TIRED OR HAVING LITTLE ENERGY: 1

## 2023-05-31 ASSESSMENT — ANXIETY QUESTIONNAIRES
GAD7 TOTAL SCORE: 9
3. WORRYING TOO MUCH ABOUT DIFFERENT THINGS: 2
4. TROUBLE RELAXING: 1
IF YOU CHECKED OFF ANY PROBLEMS ON THIS QUESTIONNAIRE, HOW DIFFICULT HAVE THESE PROBLEMS MADE IT FOR YOU TO DO YOUR WORK, TAKE CARE OF THINGS AT HOME, OR GET ALONG WITH OTHER PEOPLE: SOMEWHAT DIFFICULT
1. FEELING NERVOUS, ANXIOUS, OR ON EDGE: 2
7. FEELING AFRAID AS IF SOMETHING AWFUL MIGHT HAPPEN: 0
2. NOT BEING ABLE TO STOP OR CONTROL WORRYING: 2
5. BEING SO RESTLESS THAT IT IS HARD TO SIT STILL: 0
6. BECOMING EASILY ANNOYED OR IRRITABLE: 2

## 2023-07-05 ENCOUNTER — SCHEDULED TELEPHONE ENCOUNTER (OUTPATIENT)
Dept: INTERNAL MEDICINE | Age: 38
End: 2023-07-05

## 2023-07-05 DIAGNOSIS — F41.9 ANXIETY: ICD-10-CM

## 2023-07-05 PROCEDURE — 99999 PR OFFICE/OUTPT VISIT,PROCEDURE ONLY: CPT | Performed by: INTERNAL MEDICINE

## 2023-07-17 NOTE — PROGRESS NOTES
No care gaps noted today. Patient confirmed pharmacies.     Long term: [facility]  Short term: [facility] in [place], [place]
Sindi De La Paz is a 42 y.o. female evaluated via telephone on 7/5/2023 for Thyroid Problem    Doing well over all. Has noticed less anxiety. Feels that this is helping. Noticing some difficulty with climaxing on this medication. This is improving. The first few weeks felt she was foggy but now improved. With overall improvement, will continue current dose of escitalopram.  If no improvement in sexual function in the next 2 - 4 weeks, will need to change medication with one that has less sexual side effects. Documentation:  I communicated with the patient and/or health care decision maker about Anxiety. Details of this discussion including any medical advice provided: Follow- up for 4 weeks since the initiation of escitalopram, 5 mg daily. Total Time: minutes: <5 minutes (not billable)    Sindi De La Paz was evaluated through a synchronous (real-time) audio encounter. Patient identification was verified at the start of the visit. She (or guardian if applicable) is aware that this is a billable service, which includes applicable co-pays. This visit was conducted with the patient's (and/or legal guardian's) verbal consent. She has not had a related appointment within my department in the past 7 days or scheduled within the next 24 hours. The patient was located at Home: [address] [id number]. The provider was located at  (Appt Dept): [address] [place] [address].     Note: not billable if this call serves to triage the patient into an appointment for the relevant concern    Omari Alvarado MD  7/5/2023
I, Lisa Laguna DO,  performed the initial face to face bedside interview with this patient regarding history of present illness, review of symptoms and relevant past medical, social and family history.  I completed an independent physical examination.  I was the initial provider who evaluated this patient.   I personally saw the patient and performed a substantive portion of the visit including all aspects of the medical decision making.  The history, relevant review of systems, past medical and surgical history, medical decision making, and physical examination was documented by the scribe in my presence and I attest to the accuracy of the documentation.

## 2023-07-21 ENCOUNTER — TELEPHONE (OUTPATIENT)
Dept: INTERNAL MEDICINE | Age: 38
End: 2023-07-21

## 2023-07-21 RX ORDER — BUPROPION HYDROCHLORIDE 150 MG/1
150 TABLET ORAL EVERY MORNING
Qty: 30 TABLET | Refills: 0 | Status: SHIPPED | OUTPATIENT
Start: 2023-07-21

## 2023-07-21 RX ORDER — BUPROPION HYDROCHLORIDE 150 MG/1
150 TABLET ORAL EVERY MORNING
Qty: 90 TABLET | Refills: 1 | Status: SHIPPED
Start: 2023-07-21 | End: 2023-07-21 | Stop reason: SDUPTHER

## 2023-07-21 NOTE — PROGRESS NOTES
Hailey Flanagan reported that she was continuing to have sexual dysfunction while on escitalopram.  At this time, will discontinue escitalopram.  Will start bupropion 150 mg once daily for 3 days and if tolerating will increase to 150 mg twice daily. Hailey Flanagan is aware and we also discussed the option of adding bupropion to the escitalopram.  It was decided for ease of dosing to remain on only 1 medication. Therefore, the above strategy will be employed. Hailey Flanagan to keep me updated on how she tolerates the bupropion.   New medication sent to the pharmacy and escitalopram discontinued from the medication list.    Minerva Marquez MD  7/21/2023

## 2023-08-28 NOTE — TELEPHONE ENCOUNTER
Freddie Ibrahim relates that she has been experiencing ongoing decreased libido with use of escitalopram.  At this time, discussed options and decided on the use of Wellbutrin, 150 mg daily to see if this will assist with anxiety and improve libido. Prescription sent to pharmacy.      Alessandra Mcdonnell MD  8/28/2023

## 2023-08-29 RX ORDER — LEVOTHYROXINE SODIUM 0.03 MG/1
TABLET ORAL
Qty: 90 TABLET | Refills: 1 | Status: SHIPPED | OUTPATIENT
Start: 2023-08-29

## 2023-09-12 ENCOUNTER — OFFICE VISIT (OUTPATIENT)
Dept: INTERNAL MEDICINE | Age: 38
End: 2023-09-12
Payer: COMMERCIAL

## 2023-09-12 VITALS
TEMPERATURE: 97.4 F | OXYGEN SATURATION: 99 % | HEART RATE: 107 BPM | SYSTOLIC BLOOD PRESSURE: 120 MMHG | WEIGHT: 168 LBS | BODY MASS INDEX: 25.46 KG/M2 | DIASTOLIC BLOOD PRESSURE: 82 MMHG | RESPIRATION RATE: 18 BRPM | HEIGHT: 68 IN

## 2023-09-12 DIAGNOSIS — F41.9 ANXIETY: ICD-10-CM

## 2023-09-12 DIAGNOSIS — Z01.419 WELL WOMAN EXAM: Primary | ICD-10-CM

## 2023-09-12 DIAGNOSIS — D23.30 DERMAL NEVUS OF FACE: ICD-10-CM

## 2023-09-12 PROCEDURE — 99213 OFFICE O/P EST LOW 20 MIN: CPT | Performed by: INTERNAL MEDICINE

## 2023-09-12 PROCEDURE — 99212 OFFICE O/P EST SF 10 MIN: CPT | Performed by: INTERNAL MEDICINE

## 2023-09-12 RX ORDER — DULOXETIN HYDROCHLORIDE 30 MG/1
30 CAPSULE, DELAYED RELEASE ORAL DAILY
Qty: 30 CAPSULE | Refills: 0 | Status: SHIPPED | OUTPATIENT
Start: 2023-09-12

## 2023-09-12 RX ORDER — LEVOTHYROXINE SODIUM 0.03 MG/1
25 TABLET ORAL DAILY
Qty: 90 TABLET | Refills: 3 | Status: SHIPPED | OUTPATIENT
Start: 2023-09-12

## 2023-09-14 ENCOUNTER — OFFICE VISIT (OUTPATIENT)
Dept: SURGERY | Age: 38
End: 2023-09-14
Payer: COMMERCIAL

## 2023-09-14 VITALS
BODY MASS INDEX: 25.31 KG/M2 | DIASTOLIC BLOOD PRESSURE: 83 MMHG | OXYGEN SATURATION: 96 % | SYSTOLIC BLOOD PRESSURE: 124 MMHG | HEART RATE: 109 BPM | TEMPERATURE: 98.1 F | HEIGHT: 68 IN | WEIGHT: 167 LBS

## 2023-09-14 DIAGNOSIS — L98.9 SKIN LESION: Primary | ICD-10-CM

## 2023-09-14 PROCEDURE — 99202 OFFICE O/P NEW SF 15 MIN: CPT | Performed by: PHYSICIAN ASSISTANT

## 2023-09-14 RX ORDER — LORATADINE 10 MG/1
10 CAPSULE, LIQUID FILLED ORAL DAILY
COMMUNITY

## 2023-09-14 NOTE — PROGRESS NOTES
Alcohol/week: 0.0 standard drinks of alcohol     Comment: Socially    Drug use: No    Sexual activity: Yes     Partners: Male     Birth control/protection: Condom   Other Topics Concern    Not on file   Social History Narrative    Not on file     Social Determinants of Health     Financial Resource Strain: Low Risk  (9/14/2022)    Overall Financial Resource Strain (CARDIA)     Difficulty of Paying Living Expenses: Not hard at all   Food Insecurity: No Food Insecurity (9/14/2022)    Hunger Vital Sign     Worried About Running Out of Food in the Last Year: Never true     Ran Out of Food in the Last Year: Never true   Transportation Needs: No Transportation Needs (9/14/2022)    PRAPARE - Transportation     Lack of Transportation (Medical): No     Lack of Transportation (Non-Medical): No   Physical Activity: Not on file   Stress: Not on file   Social Connections: Not on file   Intimate Partner Violence: Not on file   Housing Stability: Low Risk  (9/14/2022)    Housing Stability Vital Sign     Unable to Pay for Housing in the Last Year: No     Number of Places Lived in the Last Year: 1     Unstable Housing in the Last Year: No     Family History:   Family History   Problem Relation Age of Onset    Stroke Mother 52       REVIEW OF SYSTEMS:    CONSTITUTIONAL:  negative for  fevers, chills, sweats and fatigue  EYES: negative for dipolpia or acute vision loss. RESPIRATORY:  negative for  dry cough, cough with sputum, dyspnea, wheezing and chest pain  HENT:negative for pain, headache, difficulty swallowing or nose bleeds. CARDIOVASCULAR:  negative for  chest pain, dyspnea, palpitations, syncope  GASTROINTESTINAL:  negative for nausea, vomiting, change in bowel habits, diarrhea, constipation and abdominal pain  EXTREMITIES: negative for edema  MUSCULOSKELETAL: negative for muscle weakness  SKIN: positive for lesion,negative for itching or rashes.   HEME: negative for easy brusing or bleeding  BEHAVIOR/PSYCH:  negative for

## 2023-09-26 RX ORDER — DULOXETIN HYDROCHLORIDE 30 MG/1
30 CAPSULE, DELAYED RELEASE ORAL DAILY
Qty: 90 CAPSULE | Refills: 1 | Status: SHIPPED | OUTPATIENT
Start: 2023-09-26

## 2023-09-27 NOTE — PROGRESS NOTES
Cymbalta working for anxiety. Request for refill sent to [facility].      Miroslava Buchanan MD  9/26/2023

## 2023-11-10 ENCOUNTER — OFFICE VISIT (OUTPATIENT)
Dept: SURGERY | Age: 38
End: 2023-11-10

## 2023-11-10 VITALS — TEMPERATURE: 99.7 F

## 2023-11-10 DIAGNOSIS — L98.9 SKIN LESION: Primary | ICD-10-CM

## 2023-11-10 PROCEDURE — MISCY120 LASER-DESTRUCTION BENIGN FACE LESION 1-5: Performed by: PLASTIC SURGERY

## 2023-11-13 NOTE — PROGRESS NOTES
Er YAG LASER Abalation of Benign Lesion Procedure Note    Laser safety protocols were followed. After consent was obtained, the right forehead and right midface area was cleansed with betadine. Local anesthesia consisting of 1% lidocaine with 1:100,000 epinepherine was injected surrounding the area. The local was allowed to work. The procedure was carried out with a 2 mm spot handpiece. The right forehead and right midface lesion2 was ablated after taking Er YAG LASER safety precautions to pinpoint bleeding dermis. The patient tolerated the procedure well. Postoperative dressing was applies consisting of bacitracin and a bandage. Postoperative instructions were reviewed. This document is generated, in part, by voice recognition software and thus  syntax and grammatical errors are possible.     Nathan Del Angel MD  11:44 AM  11/13/2023

## 2024-01-03 ENCOUNTER — OFFICE VISIT (OUTPATIENT)
Dept: OBGYN | Age: 39
End: 2024-01-03
Payer: COMMERCIAL

## 2024-01-03 VITALS
WEIGHT: 175 LBS | BODY MASS INDEX: 26.61 KG/M2 | SYSTOLIC BLOOD PRESSURE: 117 MMHG | HEART RATE: 118 BPM | DIASTOLIC BLOOD PRESSURE: 76 MMHG

## 2024-01-03 DIAGNOSIS — Z01.419 ENCOUNTER FOR GYNECOLOGICAL EXAMINATION: Primary | ICD-10-CM

## 2024-01-03 DIAGNOSIS — Z12.31 SCREENING MAMMOGRAM, ENCOUNTER FOR: ICD-10-CM

## 2024-01-03 PROCEDURE — 99386 PREV VISIT NEW AGE 40-64: CPT | Performed by: OBSTETRICS & GYNECOLOGY

## 2024-01-03 NOTE — PROGRESS NOTES
Patient new to office and was referred by her PCP, Dr. Vanessa Ware, for an annual well woman exam.  She is a former patient of Dr. Khushbu Zuñiga.  Last pap was in 2019.  Patient denies any issues or concerns at this time.  Pelvic exam done by Dr. Washington.  Pap smear obtained, labeled and sent to lab via .  Discharge instructions have been discussed with the patient by Dr. Washington and patient voiced understanding of plan of care.  Patient advised to call our office with any questions or concerns.

## 2024-01-03 NOTE — PROGRESS NOTES
HISTORY OF PRESENT ILLNESS:    38 y.o. female   presents for her annual exam.     Patient's last menstrual period was 2023 (exact date).  Periods are: regular  Contraception: condoms  Number of new sexual partners: 0  Most recent pap smear: 2019 normal per pt  History of abnormal pap smears: none  Most recent mammogram:   History of abnormal mammogram:   Most recent colonoscopy:   Dexa scan:   HPV vaccination: no, declines  Changes to health since last visit: n/a  Complaints: none    Pt has history of right breast cyst diagnosed with US in 2019. States she isn't able to feel it now.    Past Medical History:   Past Medical History:   Diagnosis Date    Anxiety     Chicken pox     Hypothyroidism     Migraine     Seasonal allergies     Seasonal allergies                                              OB History    Para Term  AB Living   0 0 0 0 0 0   SAB IAB Ectopic Molar Multiple Live Births   0 0 0 0 0 0          Past Surgical History: No past surgical history on file.     Allergies: Patient has no known allergies.     Medications:   Current Outpatient Medications   Medication Sig Dispense Refill    loratadine (CLARITIN) 10 MG capsule Take 1 capsule by mouth daily Takes for seasonal allergies      levothyroxine (SYNTHROID) 25 MCG tablet Take 1 tablet by mouth daily 90 tablet 3    Cholecalciferol (VITAMIN D) 50 MCG ( UT) CAPS capsule Take by mouth      DULoxetine (CYMBALTA) 30 MG extended release capsule Take 1 capsule by mouth daily 90 capsule 1    loratadine (CLARITIN) 10 MG tablet Take 1 tablet by mouth daily       No current facility-administered medications for this visit.         Social History:   Social History     Tobacco Use    Smoking status: Never    Smokeless tobacco: Never   Substance Use Topics    Alcohol use: Yes     Comment: Socially        Family History:   Family History   Problem Relation Age of Onset    Stroke Mother 49    Osteoporosis Mother     Congenital Heart

## 2024-01-04 LAB
HPV SAMPLE: NORMAL
HPV SOURCE: NORMAL
HPV, GENOTYPE 16: NORMAL
HPV, GENOTYPE 18: NORMAL
HPV, HIGH RISK OTHER: NORMAL
HPV, INTERPRETATION: NORMAL

## 2024-01-09 LAB
HPV SAMPLE: NORMAL
HPV SOURCE: NORMAL
HPV, GENOTYPE 16: NOT DETECTED
HPV, GENOTYPE 18: NOT DETECTED
HPV, HIGH RISK OTHER: NOT DETECTED
HPV, INTERPRETATION: NORMAL

## 2024-01-12 LAB — GYNECOLOGY CYTOLOGY REPORT: NORMAL

## 2024-01-16 ENCOUNTER — HOSPITAL ENCOUNTER (OUTPATIENT)
Dept: GENERAL RADIOLOGY | Age: 39
Discharge: HOME | End: 2024-01-18
Attending: OBSTETRICS & GYNECOLOGY

## 2024-01-16 ENCOUNTER — ANCILLARY ORDERS (OUTPATIENT)
Dept: OBGYN | Age: 39
End: 2024-01-16

## 2024-01-16 VITALS — BODY MASS INDEX: 25.01 KG/M2 | HEIGHT: 68 IN | WEIGHT: 165 LBS

## 2024-01-16 DIAGNOSIS — Z01.419 ENCOUNTER FOR GYNECOLOGICAL EXAMINATION: Primary | ICD-10-CM

## 2024-01-16 DIAGNOSIS — Z12.31 SCREENING MAMMOGRAM, ENCOUNTER FOR: ICD-10-CM

## 2024-01-16 DIAGNOSIS — R92.30 DENSE BREAST TISSUE ON MAMMOGRAM, UNSPECIFIED TYPE: ICD-10-CM

## 2024-01-16 DIAGNOSIS — Z01.419 ENCOUNTER FOR GYNECOLOGICAL EXAMINATION: ICD-10-CM

## 2024-01-16 PROCEDURE — 77067 SCR MAMMO BI INCL CAD: CPT

## 2024-01-16 PROCEDURE — 77063 BREAST TOMOSYNTHESIS BI: CPT

## 2024-02-29 ENCOUNTER — HOSPITAL ENCOUNTER (OUTPATIENT)
Dept: GENERAL RADIOLOGY | Age: 39
Discharge: HOME | End: 2024-03-02
Payer: COMMERCIAL

## 2024-02-29 DIAGNOSIS — Z12.31 SCREENING MAMMOGRAM, ENCOUNTER FOR: ICD-10-CM

## 2024-02-29 DIAGNOSIS — R92.30 DENSE BREAST TISSUE ON MAMMOGRAM, UNSPECIFIED TYPE: ICD-10-CM

## 2024-02-29 DIAGNOSIS — Z01.419 ENCOUNTER FOR GYNECOLOGICAL EXAMINATION: ICD-10-CM

## 2024-02-29 PROCEDURE — 76641 ULTRASOUND BREAST COMPLETE: CPT

## 2024-03-11 SDOH — ECONOMIC STABILITY: FOOD INSECURITY: WITHIN THE PAST 12 MONTHS, YOU WORRIED THAT YOUR FOOD WOULD RUN OUT BEFORE YOU GOT MONEY TO BUY MORE.: NEVER TRUE

## 2024-03-11 SDOH — ECONOMIC STABILITY: FOOD INSECURITY: WITHIN THE PAST 12 MONTHS, THE FOOD YOU BOUGHT JUST DIDN'T LAST AND YOU DIDN'T HAVE MONEY TO GET MORE.: NEVER TRUE

## 2024-03-11 SDOH — ECONOMIC STABILITY: INCOME INSECURITY: HOW HARD IS IT FOR YOU TO PAY FOR THE VERY BASICS LIKE FOOD, HOUSING, MEDICAL CARE, AND HEATING?: NOT HARD AT ALL

## 2024-03-12 ENCOUNTER — OFFICE VISIT (OUTPATIENT)
Dept: INTERNAL MEDICINE | Age: 39
End: 2024-03-12
Payer: COMMERCIAL

## 2024-03-12 VITALS
DIASTOLIC BLOOD PRESSURE: 74 MMHG | BODY MASS INDEX: 26.37 KG/M2 | TEMPERATURE: 97.7 F | RESPIRATION RATE: 18 BRPM | HEIGHT: 68 IN | WEIGHT: 174 LBS | HEART RATE: 105 BPM | OXYGEN SATURATION: 98 % | SYSTOLIC BLOOD PRESSURE: 128 MMHG

## 2024-03-12 DIAGNOSIS — R00.0 TACHYCARDIA: ICD-10-CM

## 2024-03-12 DIAGNOSIS — F41.9 ANXIETY: ICD-10-CM

## 2024-03-12 DIAGNOSIS — E03.9 HYPOTHYROIDISM, UNSPECIFIED TYPE: Primary | ICD-10-CM

## 2024-03-12 LAB — TSH SERPL DL<=0.05 MIU/L-ACNC: 2.27 UIU/ML (ref 0.27–4.2)

## 2024-03-12 PROCEDURE — 36415 COLL VENOUS BLD VENIPUNCTURE: CPT | Performed by: INTERNAL MEDICINE

## 2024-03-12 PROCEDURE — 99212 OFFICE O/P EST SF 10 MIN: CPT | Performed by: INTERNAL MEDICINE

## 2024-03-12 PROCEDURE — 99213 OFFICE O/P EST LOW 20 MIN: CPT | Performed by: INTERNAL MEDICINE

## 2024-03-12 ASSESSMENT — PATIENT HEALTH QUESTIONNAIRE - PHQ9
SUM OF ALL RESPONSES TO PHQ QUESTIONS 1-9: 0
1. LITTLE INTEREST OR PLEASURE IN DOING THINGS: 0
SUM OF ALL RESPONSES TO PHQ QUESTIONS 1-9: 0
SUM OF ALL RESPONSES TO PHQ QUESTIONS 1-9: 0
2. FEELING DOWN, DEPRESSED OR HOPELESS: 0
SUM OF ALL RESPONSES TO PHQ QUESTIONS 1-9: 0
SUM OF ALL RESPONSES TO PHQ9 QUESTIONS 1 & 2: 0

## 2024-03-12 NOTE — PROGRESS NOTES
Pt received venipuncture blood draw right ac area. Tolerated well with no adverse reactions. Sent one green tube to lab.

## 2024-03-12 NOTE — PROGRESS NOTES
[facility]      SUBJECTIVE:  Saadia Alaniz (:  1985) is a 38 y.o. female here for evaluation of the following chief complaint(s):  Follow-up (Pt states there are no new updates at this time.), Anxiety, and Hypothyroidism  Anxiety - did not tolerate escitalopram due to decreased libido.  Bupropion caused jitteriness. Tried duloxetine therapy but has since stopped this as well. Did well for 3 weeks, then had elevated pulses. Saadia stopped the medication. Still with anxiety symptoms, but still functioning. Counselor retired. Saadia is looking into other options. Making a change at work and Saadia is hopeful that this change will decrease her anxiety.     Refer to Recovery services for further evaluation of medication therapy for anxiety.  Multiple medications tried with significant side effects prompting Saadia to discontinue them.  Need assessment for best medication choice to control symptoms.     Facial lesions - had follow-up with plastic surgery. Had YAG laser of R forehead and R midface. Well-healed.    PRN follow-up with plastic surgery.     Hypothyroidism - has been controlled on levothyroxine, 25 mcg daily. Last check was 2023.    Check TSH    Tachycardia - this has been long-standing.    Check 2-D Echo to rule out structural cause of tachycardia.     Has followed with GYN as well since last visit. Had visit on 1/3/2024.  Due back in 1 year. Breast US and mammogram normal.  Both should be repeated in 1 year.     Review of Systems as above.     Current Outpatient Medications on File Prior to Visit   Medication Sig Dispense Refill    loratadine (CLARITIN) 10 MG capsule Take 1 capsule by mouth daily Takes for seasonal allergies      levothyroxine (SYNTHROID) 25 MCG tablet Take 1 tablet by mouth daily 90 tablet 3    Cholecalciferol (VITAMIN D) 50 MCG (2000) CAPS capsule Take by mouth       No current facility-administered medications on file

## 2024-03-19 ENCOUNTER — TELEPHONE (OUTPATIENT)
Dept: INTERNAL MEDICINE | Age: 39
End: 2024-03-19

## 2024-03-19 NOTE — TELEPHONE ENCOUNTER
Spoke to patient who states [facility] but appreciated the phone call. Emily Robb LPN

## 2024-03-19 NOTE — TELEPHONE ENCOUNTER
----- Message from Vanessa Ware MD sent at 3/13/2024  8:17 AM EDT -----  Please let Saadia know her TSH is normal.     Vanessa Ware MD  3/13/2024

## 2024-04-10 NOTE — PROGRESS NOTES
PSYCHIATRIC INITIAL EVALUATION      CHIEF COMPLAINT:  \"I'm not sure if I want to try any new medications.\"    HISTORY OF PRESENT ILLNESS: Saadia Alaniz is a 38 y.o. female who presents for psychiatric evaluation at [facility]. Patient with a past medical history of hypothyroidism and anxiety. No home psychotropics noted. Patient reports officially being diagnosed with anxiety last year, but has dealt with symptoms for majority of her life. Current symptoms of anxiety include excessive worry, racing thoughts, and feeling overwhelmed at times. Patient denies any physical symptoms of anxiety. Patient rates her anxiety a 6/10. She reports a history of treating with her PCP to help control anxiety symptoms. Patient reports being previously trialed on the following psychotropic medications: Lexapro (sexual side effects, lethargy, and weight gain), Wellbutrin (too activating), and Cymbalta (initially tolerated but then developed jitteriness and palpitations). Patient hesitant to initiate any new psychotropic medications due to sensitivity and previous side effects. Patient also reports experiencing tachycardia for a prolonged period of time and will be undergoing an echo in the future. Current stressors include work stress, reports leaving her current job position in July. Patient also notices that her anxiety symptoms \"ebb and flow with her period.\" Patient denies any symptoms of depression. Reports sleep and appetite are both adequate. Patient reports previously treating with a counselor but no longer is active in counseling as her therapist retired. Patient agreeable to discuss potential psychotropic medication options. She is pleasant and cooperative throughout interview. Patient is not currently suicidal, homicidal, psychotic, or manic.    Anxiety: See above   Depression: Denies   Milvia: Denies   SI/HI: Denies   Hallucinations: Denies   Paranoia: Denies   Sleep: Adequate  Appetite: Adequate

## 2024-04-11 ENCOUNTER — OFFICE VISIT (OUTPATIENT)
Age: 39
End: 2024-04-11
Payer: COMMERCIAL

## 2024-04-11 DIAGNOSIS — F41.1 GAD (GENERALIZED ANXIETY DISORDER): Primary | ICD-10-CM

## 2024-04-11 PROCEDURE — 90792 PSYCH DIAG EVAL W/MED SRVCS: CPT

## 2024-04-11 RX ORDER — HYDROXYZINE PAMOATE 25 MG/1
25 CAPSULE ORAL 2 TIMES DAILY PRN
Qty: 60 CAPSULE | Refills: 0 | Status: SHIPPED | OUTPATIENT
Start: 2024-04-11 | End: 2024-05-11

## 2024-05-08 NOTE — PROGRESS NOTES
PSYCHIATRY NOTE:    CC: \"This medicine is just what I needed.\"     Subjective: Patient being seen at [facility] in follow-up for TIAGO. Met with patient to discuss progress with treatment.     Tolerating PRN medication without issue  Reports only taking medication a handful of times  Rates anxiety level a 4/10   Continues to experience feelings of worry and racing thoughts at times   Denies experiencing any panic attacks or physical symptoms of anxiety  Leaving her stressful work position on May 30 and is looking forward to new job  Denies experiencing any symptoms of depression    Mental Status Examination:  White female, appears stated age. Pleasant, cooperative, forthcoming. Normal psychomotor activity, no agitation, retardation, or involuntary movements noted. Eye contact appropriate. Gait steady. Mood euthymic. Affect flexible. Speech clear. Thoughts organized. Thought content devoid of auditory or visual hallucinations. Patient does not appear overtly or covertly psychotic, paranoid, or manic. Patient denies suicidal or homicidal ideations, intent, or plan. Cognitive function appears at baseline and memory is intact. Fund of knowledge fair. Language use fair. Insight and judgment fair.     OARRS: Reviewed.    VITALS: There were no vitals filed for this visit.    LABS:   No visits with results within 2 Day(s) from this visit.   Latest known visit with results is:   Office Visit on 03/12/2024   Component Date Value Ref Range Status    TSH 03/12/2024 2.27  0.27 - 4.20 uIU/mL Final         MEDICATIONS:         Vistaril 25 mg BID PRN (continue)     ASSESSMENT:          Generalized Anxiety Disorder     PLAN: Therapeutic communication and emotional support offered. Patient feels stable on PRN Vistaril and would like to avoid adding any scheduled psychotropics at this time. Also informed patient that we will be offering counseling services here at the clinic in the near future, which she will consider.

## 2024-05-09 ENCOUNTER — OFFICE VISIT (OUTPATIENT)
Age: 39
End: 2024-05-09
Payer: COMMERCIAL

## 2024-05-09 DIAGNOSIS — F41.1 GAD (GENERALIZED ANXIETY DISORDER): ICD-10-CM

## 2024-05-09 PROCEDURE — 99213 OFFICE O/P EST LOW 20 MIN: CPT

## 2024-05-09 RX ORDER — HYDROXYZINE PAMOATE 25 MG/1
25 CAPSULE ORAL 2 TIMES DAILY PRN
Qty: 60 CAPSULE | Refills: 2 | Status: SHIPPED | OUTPATIENT
Start: 2024-05-09 | End: 2024-08-07

## 2024-06-19 ENCOUNTER — TELEPHONE (OUTPATIENT)
Dept: OBGYN | Age: 39
End: 2024-06-19

## 2024-06-19 NOTE — TELEPHONE ENCOUNTER
Patient was last seen in January. Called in stating that the Billing Dept needs you to change the order of the Dx codes on her Mammo order.  Currently, you have screening mammogram listed as a secondary dx and it needs to be first for the Mammogram.

## 2024-07-02 ENCOUNTER — HOSPITAL ENCOUNTER (OUTPATIENT)
Age: 39
Discharge: HOME | End: 2024-07-04
Payer: COMMERCIAL

## 2024-07-02 VITALS
HEIGHT: 68 IN | DIASTOLIC BLOOD PRESSURE: 74 MMHG | SYSTOLIC BLOOD PRESSURE: 128 MMHG | HEART RATE: 105 BPM | WEIGHT: 174 LBS | BODY MASS INDEX: 26.37 KG/M2

## 2024-07-02 DIAGNOSIS — R00.0 TACHYCARDIA: ICD-10-CM

## 2024-07-02 PROCEDURE — 93306 TTE W/DOPPLER COMPLETE: CPT

## 2024-07-03 ENCOUNTER — TELEPHONE (OUTPATIENT)
Dept: INTERNAL MEDICINE | Age: 39
End: 2024-07-03

## 2024-07-03 DIAGNOSIS — Q21.12 PFO (PATENT FORAMEN OVALE): ICD-10-CM

## 2024-07-03 DIAGNOSIS — R00.0 TACHYCARDIA: Primary | ICD-10-CM

## 2024-07-03 LAB
ECHO AO ASC DIAM: 2.7 CM
ECHO AO ASCENDING AORTA INDEX: 1.4 CM/M2
ECHO AO ROOT DIAM: 2.1 CM
ECHO AO ROOT INDEX: 1.09 CM/M2
ECHO AO SINUS VALSALVA DIAM: 2.7 CM
ECHO AO SINUS VALSALVA INDEX: 1.4 CM/M2
ECHO AV AREA PEAK VELOCITY: 2.6 CM2
ECHO AV AREA VTI: 2.5 CM2
ECHO AV AREA/BSA PEAK VELOCITY: 1.3 CM2/M2
ECHO AV AREA/BSA VTI: 1.3 CM2/M2
ECHO AV CUSP MM: 1.6 CM
ECHO AV MEAN GRADIENT: 3 MMHG
ECHO AV MEAN VELOCITY: 0.9 M/S
ECHO AV PEAK GRADIENT: 6 MMHG
ECHO AV PEAK VELOCITY: 1.2 M/S
ECHO AV VELOCITY RATIO: 0.83
ECHO AV VTI: 17.2 CM
ECHO BSA: 1.95 M2
ECHO LA DIAMETER INDEX: 1.66 CM/M2
ECHO LA DIAMETER: 3.2 CM
ECHO LA TO AORTIC ROOT RATIO: 1.52
ECHO LA VOL A-L A2C: 40 ML (ref 22–52)
ECHO LA VOL A-L A4C: 29 ML (ref 22–52)
ECHO LA VOL MOD A2C: 37 ML (ref 22–52)
ECHO LA VOL MOD A4C: 28 ML (ref 22–52)
ECHO LA VOLUME AREA LENGTH: 35 ML
ECHO LA VOLUME INDEX A-L A2C: 21 ML/M2 (ref 16–34)
ECHO LA VOLUME INDEX A-L A4C: 15 ML/M2 (ref 16–34)
ECHO LA VOLUME INDEX AREA LENGTH: 18 ML/M2 (ref 16–34)
ECHO LA VOLUME INDEX MOD A2C: 19 ML/M2 (ref 16–34)
ECHO LA VOLUME INDEX MOD A4C: 15 ML/M2 (ref 16–34)
ECHO LV E' LATERAL VELOCITY: 9 CM/S
ECHO LV E' SEPTAL VELOCITY: 11 CM/S
ECHO LV FRACTIONAL SHORTENING: 37 % (ref 28–44)
ECHO LV INTERNAL DIMENSION DIASTOLE INDEX: 2.23 CM/M2
ECHO LV INTERNAL DIMENSION DIASTOLIC: 4.3 CM (ref 3.9–5.3)
ECHO LV INTERNAL DIMENSION SYSTOLIC INDEX: 1.4 CM/M2
ECHO LV INTERNAL DIMENSION SYSTOLIC: 2.7 CM
ECHO LV ISOVOLUMETRIC RELAXATION TIME (IVRT): 58.8 MS
ECHO LV IVSD: 0.9 CM (ref 0.6–0.9)
ECHO LV IVSS: 1 CM
ECHO LV MASS 2D: 123.3 G (ref 67–162)
ECHO LV MASS INDEX 2D: 63.9 G/M2 (ref 43–95)
ECHO LV POSTERIOR WALL DIASTOLIC: 0.9 CM (ref 0.6–0.9)
ECHO LV POSTERIOR WALL SYSTOLIC: 1 CM
ECHO LV RELATIVE WALL THICKNESS RATIO: 0.42
ECHO LVOT AREA: 3.1 CM2
ECHO LVOT AV VTI INDEX: 0.81
ECHO LVOT DIAM: 2 CM
ECHO LVOT MEAN GRADIENT: 2 MMHG
ECHO LVOT PEAK GRADIENT: 4 MMHG
ECHO LVOT PEAK VELOCITY: 1 M/S
ECHO LVOT STROKE VOLUME INDEX: 22.6 ML/M2
ECHO LVOT SV: 43.6 ML
ECHO LVOT VTI: 13.9 CM
ECHO MV "A" WAVE DURATION: 72.7 MSEC
ECHO MV A VELOCITY: 0.77 M/S
ECHO MV AREA PHT: 4.8 CM2
ECHO MV AREA VTI: 3.3 CM2
ECHO MV E DECELERATION TIME (DT): 145.2 MS
ECHO MV E VELOCITY: 0.64 M/S
ECHO MV E/A RATIO: 0.83
ECHO MV E/E' LATERAL: 7.11
ECHO MV E/E' RATIO (AVERAGED): 6.46
ECHO MV E/E' SEPTAL: 5.82
ECHO MV LVOT VTI INDEX: 0.95
ECHO MV MAX VELOCITY: 0.9 M/S
ECHO MV MEAN GRADIENT: 2 MMHG
ECHO MV MEAN VELOCITY: 0.6 M/S
ECHO MV PEAK GRADIENT: 3 MMHG
ECHO MV PRESSURE HALF TIME (PHT): 46.3 MS
ECHO MV VTI: 13.2 CM
ECHO PV MAX VELOCITY: 1.1 M/S
ECHO PV MEAN GRADIENT: 2 MMHG
ECHO PV MEAN VELOCITY: 0.7 M/S
ECHO PV PEAK GRADIENT: 5 MMHG
ECHO PV VTI: 14.8 CM
ECHO PVEIN A DURATION: 58.8 MS
ECHO PVEIN A VELOCITY: 0.3 M/S
ECHO PVEIN PEAK D VELOCITY: 0.4 M/S
ECHO PVEIN PEAK S VELOCITY: 0.7 M/S
ECHO PVEIN S/D RATIO: 1.8
ECHO RV INTERNAL DIMENSION: 3.3 CM
ECHO RV TAPSE: 2.4 CM (ref 1.7–?)

## 2024-07-03 PROCEDURE — 93306 TTE W/DOPPLER COMPLETE: CPT | Performed by: INTERNAL MEDICINE

## 2024-07-03 NOTE — TELEPHONE ENCOUNTER
Called Saadia re: Echo results with PFO and continued tachycardia.  Will refer to cardiology for further assessment of both issues.     Additional labs ordered to further evaluate this ongoing tachycardia.      Saadia is aware of this plan and is in agreement with the same.     Vanessa Ware MD  7/3/2024

## 2024-07-12 DIAGNOSIS — R00.0 TACHYCARDIA: ICD-10-CM

## 2024-07-12 LAB
BASOPHILS ABSOLUTE: 0.04 K/UL (ref 0–0.2)
BASOPHILS RELATIVE PERCENT: 0 % (ref 0–2)
CORTISOL COLLECTION INFO: 0
CORTISOL: 11.4 UG/DL (ref 2.7–18.4)
EOSINOPHILS ABSOLUTE: 0.28 K/UL (ref 0.05–0.5)
EOSINOPHILS RELATIVE PERCENT: 3 % (ref 0–6)
HCT VFR BLD CALC: 40.9 % (ref 34–48)
HEMOGLOBIN: 13.5 G/DL (ref 11.5–15.5)
IMMATURE GRANULOCYTES %: 0 % (ref 0–5)
IMMATURE GRANULOCYTES ABSOLUTE: 0.04 K/UL (ref 0–0.58)
LYMPHOCYTES ABSOLUTE: 1.72 K/UL (ref 1.5–4)
LYMPHOCYTES RELATIVE PERCENT: 17 % (ref 20–42)
MCH RBC QN AUTO: 33.2 PG (ref 26–35)
MCHC RBC AUTO-ENTMCNC: 33 G/DL (ref 32–34.5)
MCV RBC AUTO: 100.5 FL (ref 80–99.9)
MONOCYTES ABSOLUTE: 0.68 K/UL (ref 0.1–0.95)
MONOCYTES RELATIVE PERCENT: 7 % (ref 2–12)
NEUTROPHILS ABSOLUTE: 7.16 K/UL (ref 1.8–7.3)
NEUTROPHILS RELATIVE PERCENT: 72 % (ref 43–80)
PDW BLD-RTO: 12.3 % (ref 11.5–15)
PLATELET # BLD: 285 K/UL (ref 130–450)
PMV BLD AUTO: 10.3 FL (ref 7–12)
RBC # BLD: 4.07 M/UL (ref 3.5–5.5)
T4 FREE: 1.3 NG/DL (ref 0.9–1.7)
TSH SERPL DL<=0.05 MIU/L-ACNC: 3.57 UIU/ML (ref 0.27–4.2)
WBC # BLD: 9.9 K/UL (ref 4.5–11.5)

## 2024-07-29 ENCOUNTER — TELEPHONE (OUTPATIENT)
Dept: OBGYN | Age: 39
End: 2024-07-29

## 2024-07-29 NOTE — TELEPHONE ENCOUNTER
Patient/caller requesting to  Speak with provider regarding Mammogram Billing .  Patient may be reached at: [phone].    Patient had a mammogram on 01/16/2024 that was billed as a gynecological exam. Patient had called in to get the issue resolved. But another order was placed on 06/19/2024 for another mammogram. Active order on 06/19/2024 needs canceled and the order for Mammogram diagnosis billing code on 01/16/2024 needs an addendum to bill for Routine Mammogram screening and not Gyn Exam.      : Jenna    Spoke with representative from billing department so that billing is aware we are working to correct the diagnosis on the account.

## 2024-07-29 NOTE — TELEPHONE ENCOUNTER
Called the insurance company and spoke with Representative Sheridan. After looking at the claims for date of 01/16/2024 rep was able to determine that patient had 2 charges for Mammogram screening on the same day. One was billed under Routine Mammogram screening and was paid in full by insurance, the other was billed as a diagnostic mammogram screening and was partially paid by insurance leaving a patient balance. Representative said patient had 2 mammogram screenings the same day. I told her that was incorrect, patient had one mammogram screening on 01/16/2024.

## 2024-07-29 NOTE — TELEPHONE ENCOUNTER
I was unable to add the diagnosis to the old order that's why a new order was placed. Please have pt call billing department. Correct diagnosis code is in her chart. Thanks.

## 2024-07-29 NOTE — TELEPHONE ENCOUNTER
Called patient to inform her of the duplicate charge. Also informed her that her account was being put under review for 30 days to address the charges for her mammogram screening on 01/06/2024, and that she can reach out to check the status after the 30 day review. Patient voiced understanding.

## 2024-07-29 NOTE — TELEPHONE ENCOUNTER
Spoke with Jenna again regarding Mammogram screening on 01/16/2024. Informed her that per  patient has duplicate charges for mammogram screening on 01/06/2024, one paid in full coded as Routine mammogram screening and the second charge partially paid leaving the patient with the 196.00 balance coded as a diagnostic mammogram screening. Representative stated she will put the put the patient account under review for the duplicate charge, it will remain under review for 30 days. Patient can call in after that to check on the status of the claim for mammogram screening.

## 2024-07-29 NOTE — TELEPHONE ENCOUNTER
Called patient to update her on my call with the billing department. Per representative Nanda, her Diagnosis code for the mammogram screening was updated. The balance she is prompted to pay is the balance after her insurance was billed. Representative said that if her Mammogram was supposed to be free or covered by insurance, she would need to reach out to her insurance company and get the correct ICD Code so that it could be updated. Left a message on patient's voice mail regarding the issue with call back information.

## 2024-07-29 NOTE — TELEPHONE ENCOUNTER
Patient called in and said after speaking with her insurance the Mammogram screening should have been covered at 100%. Informed patient I would reach out to her insurance company.

## 2024-07-30 NOTE — PROGRESS NOTES
PSYCHIATRY NOTE:    CC: \"I have barely taken the Vistaril.\"    Subjective: Patient being seen at [facility] in follow-up for TIAGO. Met with patient to discuss progress with treatment.     Tolerating PRN Vistaril without issue, has had to sparingly use   Reports enjoying new job, left stressful work position   Has still been dealing with tachycardia  Underwent ECHO which was negative   Questioning if tachycardia can be hormone related  Awaiting appointment with cardiology   Reports utilizing coping skills and feels anxiety symptoms are manageable   Denies experiencing any panic attacks or other physical symptoms of anxiety  Denies experiencing any symptoms of depression  Would like to continue PRN antianxiety medication     Mental Status Examination:  White female, appears stated age. Pleasant, cooperative, forthcoming. Normal psychomotor activity, no agitation, retardation, or involuntary movements noted. Eye contact appropriate. Gait steady. Mood euthymic. Affect flexible. Speech clear. Thoughts organized. Thought content devoid of auditory or visual hallucinations. Patient does not appear overtly or covertly psychotic, paranoid, or manic. Patient denies suicidal or homicidal ideations, intent, or plan. Cognitive function appears at baseline and memory is intact. Fund of knowledge fair. Language use fair. Insight and judgment fair.     OARRS: Reviewed. 000.    VITALS: There were no vitals filed for this visit.    LABS:   No visits with results within 2 Day(s) from this visit.   Latest known visit with results is:   Orders Only on 07/12/2024   Component Date Value Ref Range Status    Cortisol 07/12/2024 11.4  2.7 - 18.4 ug/dL Final    Cortisol Collection Info 07/12/2024 0   Final    T4 Free 07/12/2024 1.3  0.9 - 1.7 ng/dL Final    TSH 07/12/2024 3.57  0.27 - 4.20 uIU/mL Final    WBC 07/12/2024 9.9  4.5 - 11.5 k/uL Final    RBC 07/12/2024 4.07  3.50 - 5.50 m/uL Final    Hemoglobin 07/12/2024 13.5  11.5

## 2024-08-01 ENCOUNTER — OFFICE VISIT (OUTPATIENT)
Age: 39
End: 2024-08-01
Payer: COMMERCIAL

## 2024-08-01 VITALS
DIASTOLIC BLOOD PRESSURE: 65 MMHG | OXYGEN SATURATION: 100 % | HEART RATE: 102 BPM | TEMPERATURE: 97.5 F | SYSTOLIC BLOOD PRESSURE: 113 MMHG

## 2024-08-01 DIAGNOSIS — F41.1 GAD (GENERALIZED ANXIETY DISORDER): Primary | ICD-10-CM

## 2024-08-01 PROCEDURE — 99214 OFFICE O/P EST MOD 30 MIN: CPT

## 2024-09-04 ENCOUNTER — TELEPHONE (OUTPATIENT)
Dept: OBGYN | Age: 39
End: 2024-09-04

## 2024-09-04 NOTE — TELEPHONE ENCOUNTER
Patient left voicemail asking what has been done about the duplicate charges.  Please reach out to patient.

## 2024-09-26 NOTE — PROGRESS NOTES
PSYCHIATRY NOTE:    CC: \"I've been doing a lot of new things.\"     Subjective: Patient being seen at [facility] in follow-up for TIAGO. Met with patient to discuss progress with treatment.     Continues to tolerate PRN Vistaril without issue  Has sparingly had to use medication for breakthrough periods of anxiety   Overall feels symptoms of anxiety are manageable   Denies experiencing any panic attacks or other physical symptoms of anxiety  Denies experiencing any symptoms of depression  Starting a new business for [employer]   Recently started working with a new therapist and   Recently had trip to [place] for 2 weeks   Patient pleasant and cooperative throughout conversation, brightens appropriately   No new psychiatric complaints     Mental Status Examination:  White female, appears stated age. Pleasant, cooperative, forthcoming. Normal psychomotor activity, no agitation, retardation, or involuntary movements noted. Eye contact appropriate. Gait steady. Mood euthymic. Affect flexible. Speech clear. Thoughts organized. Thought content devoid of auditory or visual hallucinations. Patient does not appear overtly or covertly psychotic, paranoid, or manic. Patient denies suicidal or homicidal ideations, intent, or plan. Cognitive function appears at baseline and memory is intact. Fund of knowledge fair. Language use fair. Insight and judgment fair.     OARRS: Reviewed. 000.    VITALS: There were no vitals filed for this visit.    LABS:   No visits with results within 2 Day(s) from this visit.   Latest known visit with results is:   Orders Only on 07/12/2024   Component Date Value Ref Range Status    Cortisol 07/12/2024 11.4  2.7 - 18.4 ug/dL Final    Cortisol Collection Info 07/12/2024 0   Final    T4 Free 07/12/2024 1.3  0.9 - 1.7 ng/dL Final    TSH 07/12/2024 3.57  0.27 - 4.20 uIU/mL Final    WBC 07/12/2024 9.9  4.5 - 11.5 k/uL Final    RBC 07/12/2024 4.07  3.50 - 5.50 m/uL Final

## 2024-09-30 ENCOUNTER — OFFICE VISIT (OUTPATIENT)
Age: 39
End: 2024-09-30
Payer: COMMERCIAL

## 2024-09-30 DIAGNOSIS — F41.1 GAD (GENERALIZED ANXIETY DISORDER): Primary | ICD-10-CM

## 2024-09-30 PROCEDURE — 99213 OFFICE O/P EST LOW 20 MIN: CPT

## 2024-09-30 RX ORDER — HYDROXYZINE PAMOATE 25 MG/1
25 CAPSULE ORAL 2 TIMES DAILY PRN
Qty: 60 CAPSULE | Refills: 0 | Status: SHIPPED | OUTPATIENT
Start: 2024-09-30 | End: 2024-10-30

## 2024-10-10 ENCOUNTER — OFFICE VISIT (OUTPATIENT)
Dept: INTERNAL MEDICINE | Age: 39
End: 2024-10-10
Payer: COMMERCIAL

## 2024-10-10 VITALS
TEMPERATURE: 97 F | OXYGEN SATURATION: 98 % | SYSTOLIC BLOOD PRESSURE: 139 MMHG | HEART RATE: 110 BPM | WEIGHT: 174 LBS | HEIGHT: 68 IN | RESPIRATION RATE: 18 BRPM | BODY MASS INDEX: 26.37 KG/M2 | DIASTOLIC BLOOD PRESSURE: 68 MMHG

## 2024-10-10 DIAGNOSIS — Z20.822 COVID-19 RULED OUT: ICD-10-CM

## 2024-10-10 DIAGNOSIS — Z01.84 LACK OF IMMUNITY TO HEPATITIS B VIRUS DEMONSTRATED BY SEROLOGIC TEST: ICD-10-CM

## 2024-10-10 DIAGNOSIS — Z23 FLU VACCINE NEED: ICD-10-CM

## 2024-10-10 DIAGNOSIS — Z86.19 HISTORY OF HEPATITIS B VIRUS INFECTION CONFERRING IMMUNITY: Primary | ICD-10-CM

## 2024-10-10 DIAGNOSIS — D75.89 INCREASED MCV: ICD-10-CM

## 2024-10-10 LAB
INFLUENZA A ANTIGEN, POC: NEGATIVE
INFLUENZA B ANTIGEN, POC: NEGATIVE
LOT EXPIRE DATE: NORMAL
LOT KIT NUMBER: NORMAL
SARS-COV-2, POC: NORMAL
VALID INTERNAL CONTROL: NORMAL
VENDOR AND KIT NAME POC: NORMAL

## 2024-10-10 PROCEDURE — 87428 SARSCOV & INF VIR A&B AG IA: CPT | Performed by: INTERNAL MEDICINE

## 2024-10-10 PROCEDURE — 99212 OFFICE O/P EST SF 10 MIN: CPT | Performed by: INTERNAL MEDICINE

## 2024-10-10 NOTE — PROGRESS NOTES
[facility] Medicine      SUBJECTIVE:  Saadia Alaniz (:  1985) is a 38 y.o. female here for evaluation of the following chief complaint(s):  6 Month Follow-Up  Anxiety - follows with Shu Weir CNP at [facility].  On hydroxyzine, 25 mg twice daily as needed for anxiety. Last visit was on 2024.  Has been doing well per office note. Started with new therapist in [place].  Has next session tomorrow. Seeing her every other week.    Continue present management.      Hypothyroidism - has been controlled on levothyroxine, 25 mcg daily. Last check of TSH was on 2024 and was normal but increased from previous.    Continue same dose of levothyroxine.      Tachycardia - this has been long-standing. Normal 2-D Echo completed on 2024. ? Has [facility] insurance.  Will refer to [facility] for this follow-up.    Referral to be placed to [facility] for further evaluation.                Increased MCV - new finding. Decreased lymphocytes   Repeat CBC with diff and check peripheral blood smear.     Sinus congestion with runny nose.  Had sore throat with change in voice.  + cough. Worse at night.    Check COVID/flu and strep    All negative. Continue symptomatic management.     GYN appointment due next in January.   Started with a  and now lifting weights.     Review of Systems as above.     Current Outpatient Medications on File Prior to Visit   Medication Sig Dispense Refill    hydrOXYzine pamoate (VISTARIL) 25 MG capsule Take 1 capsule by mouth 2 times daily as needed for Anxiety 60 capsule 0    loratadine (CLARITIN) 10 MG capsule Take 1 capsule by mouth daily Takes for seasonal allergies      levothyroxine (SYNTHROID) 25 MCG tablet Take 1 tablet by mouth daily 90 tablet 3    Cholecalciferol (VITAMIN D) 50 MCG (2000) CAPS capsule Take by mouth       No current facility-administered medications on file prior to visit.

## 2024-10-10 NOTE — PROGRESS NOTES
All instructions reviewed with patient by   Follow-up appointment scheduled and printed AVS mailed to patient

## 2024-10-14 RX ORDER — AZITHROMYCIN 250 MG/1
TABLET, FILM COATED ORAL
Qty: 6 TABLET | Refills: 0 | Status: SHIPPED | OUTPATIENT
Start: 2024-10-14 | End: 2024-10-24

## 2024-10-14 NOTE — PROGRESS NOTES
Saadia states that symptoms of sinus congestion are worsening since office visit last week.+ yellow phlegm as well.  At this time, with ongoing symptoms, will send antibiotics to treat bacterial sinusitis.     Vanessa Ware MD  10/14/2024

## 2024-11-11 RX ORDER — LEVOTHYROXINE SODIUM 25 UG/1
25 TABLET ORAL DAILY
Qty: 90 TABLET | Refills: 1 | Status: SHIPPED | OUTPATIENT
Start: 2024-11-11

## 2024-11-11 NOTE — TELEPHONE ENCOUNTER
Name of Medication(s) Requested:  Requested Prescriptions     Pending Prescriptions Disp Refills    levothyroxine (SYNTHROID) 25 MCG tablet [Pharmacy Med Name: L-THYROXINE (SYNTHROID) TABS 25MCG] 90 tablet 3     Sig: TAKE 1 TABLET DAILY       Medication is on current medication list Yes    Dosage and directions were verified? Yes    Quantity verified: 90 day supply     Pharmacy Verified?  Yes    Last Appointment:  10/10/2024    Future appts:  Future Appointments   Date Time Provider Department Center   3/5/2025  1:30 PM Mary Weir, APRN - CNP [facility]   4/10/2025  9:45 AM Vanessa Ware MD [facility]        (If no appt send self scheduling link. .REFILLAPPT)  Scheduling request sent?     [] Yes  [] No    Does patient need updated?  [] Yes  [] No

## 2024-11-12 DIAGNOSIS — D75.89 INCREASED MCV: ICD-10-CM

## 2024-11-12 LAB
BASOPHILS ABSOLUTE: 0.04 K/UL (ref 0–0.2)
BASOPHILS RELATIVE PERCENT: 1 % (ref 0–2)
EOSINOPHILS ABSOLUTE: 0.26 K/UL (ref 0.05–0.5)
EOSINOPHILS RELATIVE PERCENT: 4 % (ref 0–6)
HCT VFR BLD CALC: 42 % (ref 34–48)
HEMOGLOBIN: 13.4 G/DL (ref 11.5–15.5)
IMMATURE GRANULOCYTES %: 0 % (ref 0–5)
IMMATURE GRANULOCYTES ABSOLUTE: <0.03 K/UL (ref 0–0.58)
LYMPHOCYTES ABSOLUTE: 1.57 K/UL (ref 1.5–4)
LYMPHOCYTES RELATIVE PERCENT: 23 % (ref 20–42)
MCH RBC QN AUTO: 31.9 PG (ref 26–35)
MCHC RBC AUTO-ENTMCNC: 31.9 G/DL (ref 32–34.5)
MCV RBC AUTO: 100 FL (ref 80–99.9)
MONOCYTES ABSOLUTE: 0.49 K/UL (ref 0.1–0.95)
MONOCYTES RELATIVE PERCENT: 7 % (ref 2–12)
NEUTROPHILS ABSOLUTE: 4.32 K/UL (ref 1.8–7.3)
NEUTROPHILS RELATIVE PERCENT: 65 % (ref 43–80)
PDW BLD-RTO: 12.4 % (ref 11.5–15)
PLATELET # BLD: 273 K/UL (ref 130–450)
PMV BLD AUTO: 11.3 FL (ref 7–12)
RBC # BLD: 4.2 M/UL (ref 3.5–5.5)
WBC # BLD: 6.7 K/UL (ref 4.5–11.5)

## 2025-03-03 NOTE — PROGRESS NOTES
12.0 fL Final    Neutrophils % 11/12/2024 65  43.0 - 80.0 % Final    Lymphocytes % 11/12/2024 23  20.0 - 42.0 % Final    Monocytes % 11/12/2024 7  2.0 - 12.0 % Final    Eosinophils % 11/12/2024 4  0 - 6 % Final    Basophils % 11/12/2024 1  0.0 - 2.0 % Final    Immature Granulocytes % 11/12/2024 0  0.0 - 5.0 % Final    Neutrophils Absolute 11/12/2024 4.32  1.80 - 7.30 k/uL Final    Lymphocytes Absolute 11/12/2024 1.57  1.50 - 4.00 k/uL Final    Monocytes Absolute 11/12/2024 0.49  0.10 - 0.95 k/uL Final    Eosinophils Absolute 11/12/2024 0.26  0.05 - 0.50 k/uL Final    Basophils Absolute 11/12/2024 0.04  0.00 - 0.20 k/uL Final    Immature Granulocytes Absolute 11/12/2024 <0.03  0.00 - 0.58 k/uL Final         MEDICATIONS:         Vistaril 25 mg BID PRN (continue)     ASSESSMENT:          Generalized Anxiety Disorder     PLAN: Therapeutic communication and emotional support offered. Patient remains stable on current psychotropic medication regimen without issue. Continue counseling. RTC in 6 months and patient advised to call sooner if needed.                          NOTE:  This report was transcribed using voice recognition software.  Every effort was made to ensure accuracy; however, inadvertent computerized transcription errors may be present.     20 minutes was spent providing face-to-face patient care, including:  and coordinating care, reviewing the chart, labs, and diagnostics, as well as medical decision making.     Electronically signed by JULIO Isidro CNP on 3/5/2025 at 1:27 PM

## 2025-03-05 ENCOUNTER — OFFICE VISIT (OUTPATIENT)
Age: 40
End: 2025-03-05
Payer: COMMERCIAL

## 2025-03-05 DIAGNOSIS — F41.1 GAD (GENERALIZED ANXIETY DISORDER): Primary | ICD-10-CM

## 2025-03-05 PROCEDURE — 99213 OFFICE O/P EST LOW 20 MIN: CPT

## 2025-03-05 RX ORDER — HYDROXYZINE PAMOATE 25 MG/1
25 CAPSULE ORAL 2 TIMES DAILY PRN
Qty: 180 CAPSULE | Refills: 0 | Status: SHIPPED | OUTPATIENT
Start: 2025-03-05 | End: 2025-06-03

## 2025-05-09 RX ORDER — LEVOTHYROXINE SODIUM 25 UG/1
25 TABLET ORAL DAILY
Qty: 90 TABLET | Refills: 3 | Status: SHIPPED | OUTPATIENT
Start: 2025-05-09

## 2025-05-09 NOTE — TELEPHONE ENCOUNTER
Name of Medication(s) Requested:  Requested Prescriptions     Pending Prescriptions Disp Refills    levothyroxine (SYNTHROID) 25 MCG tablet [Pharmacy Med Name: L-THYROXINE (SYNTHROID) TABS 25MCG] 90 tablet 3     Sig: TAKE 1 TABLET DAILY       Medication is on current medication list Yes    Dosage and directions were verified? Yes    Quantity verified: 90 day supply     Pharmacy Verified?  Yes    Last Appointment:  10/10/2024    Future appts:  Future Appointments   Date Time Provider Department Center   5/19/2025  9:00 AM Vanessa Ware MD [facility] DEP   6/17/2025  2:30 PM Sveta Washington DO [facility]   9/3/2025  1:30 PM Mary Weir, APRN - CNP [facility]        (If no appt send self scheduling link. .REFILLAPPT)  Scheduling request sent?     [] Yes  [x] No    Does patient need updated?  [] Yes  [x] No

## 2025-06-14 SDOH — ECONOMIC STABILITY: INCOME INSECURITY: IN THE LAST 12 MONTHS, WAS THERE A TIME WHEN YOU WERE NOT ABLE TO PAY THE MORTGAGE OR RENT ON TIME?: NO

## 2025-06-14 SDOH — ECONOMIC STABILITY: FOOD INSECURITY: WITHIN THE PAST 12 MONTHS, YOU WORRIED THAT YOUR FOOD WOULD RUN OUT BEFORE YOU GOT MONEY TO BUY MORE.: NEVER TRUE

## 2025-06-14 SDOH — ECONOMIC STABILITY: FOOD INSECURITY: WITHIN THE PAST 12 MONTHS, THE FOOD YOU BOUGHT JUST DIDN'T LAST AND YOU DIDN'T HAVE MONEY TO GET MORE.: NEVER TRUE

## 2025-06-17 ENCOUNTER — OFFICE VISIT (OUTPATIENT)
Dept: OBGYN | Age: 40
End: 2025-06-17
Payer: COMMERCIAL

## 2025-06-17 VITALS
BODY MASS INDEX: 27.89 KG/M2 | WEIGHT: 184 LBS | HEIGHT: 68 IN | DIASTOLIC BLOOD PRESSURE: 69 MMHG | SYSTOLIC BLOOD PRESSURE: 129 MMHG | HEART RATE: 114 BPM

## 2025-06-17 DIAGNOSIS — R92.30 DENSE BREAST TISSUE ON MAMMOGRAM, UNSPECIFIED TYPE: ICD-10-CM

## 2025-06-17 DIAGNOSIS — Z01.419 ENCOUNTER FOR GYNECOLOGICAL EXAMINATION: Primary | ICD-10-CM

## 2025-06-17 DIAGNOSIS — Z12.31 SCREENING MAMMOGRAM, ENCOUNTER FOR: ICD-10-CM

## 2025-06-17 PROCEDURE — 99396 PREV VISIT EST AGE 40-64: CPT | Performed by: OBSTETRICS & GYNECOLOGY

## 2025-06-17 ASSESSMENT — PATIENT HEALTH QUESTIONNAIRE - PHQ9
SUM OF ALL RESPONSES TO PHQ QUESTIONS 1-9: 0
1. LITTLE INTEREST OR PLEASURE IN DOING THINGS: NOT AT ALL
SUM OF ALL RESPONSES TO PHQ QUESTIONS 1-9: 0
SUM OF ALL RESPONSES TO PHQ QUESTIONS 1-9: 0
2. FEELING DOWN, DEPRESSED OR HOPELESS: NOT AT ALL
SUM OF ALL RESPONSES TO PHQ QUESTIONS 1-9: 0

## 2025-06-17 NOTE — PROGRESS NOTES
HISTORY OF PRESENT ILLNESS:    39 y.o. female   presents for her annual exam.     Patient's last menstrual period was 2025.  Periods are: regular  Contraception: vasectomy  Number of new sexual partners: 0  Most recent pap smear:  normal cytology, neg HPV  History of abnormal pap smears: none  Most recent mammogram:  normal, dense breasts  History of abnormal mammogram: none  Most recent colonoscopy:   Dexa scan:   HPV vaccination: no, declines  Changes to health since last visit: none  Complaints: none    Pt has history of right breast cyst diagnosed with US in 2019. States she isn't able to feel it now.    Past Medical History:   Past Medical History:   Diagnosis Date    Anxiety     Chicken pox     Hypothyroidism     Migraine     Seasonal allergies     Seasonal allergies                                              OB History    Para Term  AB Living   0 0 0 0 0 0   SAB IAB Ectopic Molar Multiple Live Births   0 0 0 0 0 0          Past Surgical History: No past surgical history on file.     Allergies: Patient has no known allergies.     Medications:   Current Outpatient Medications   Medication Sig Dispense Refill    levothyroxine (SYNTHROID) 25 MCG tablet TAKE 1 TABLET DAILY 90 tablet 3    loratadine (CLARITIN) 10 MG capsule Take 1 capsule by mouth daily Takes for seasonal allergies      Cholecalciferol (VITAMIN D) 50 MCG (2000 UT) CAPS capsule Take by mouth       No current facility-administered medications for this visit.         Social History:   Social History     Tobacco Use    Smoking status: Never    Smokeless tobacco: Never   Substance Use Topics    Alcohol use: Yes     Comment: Socially        Family History:   Family History   Problem Relation Age of Onset    Stroke Mother 49    Osteoporosis Mother     Congenital Heart Defect Mother         hole in heart       REVIEW OF SYSTEMS:    Constitutional: negative  HEENT: negative  Breast: negative  Respiratory:

## 2025-07-10 ASSESSMENT — PATIENT HEALTH QUESTIONNAIRE - PHQ9
SUM OF ALL RESPONSES TO PHQ QUESTIONS 1-9: 0
2. FEELING DOWN, DEPRESSED OR HOPELESS: NOT AT ALL
1. LITTLE INTEREST OR PLEASURE IN DOING THINGS: NOT AT ALL
SUM OF ALL RESPONSES TO PHQ QUESTIONS 1-9: 0
SUM OF ALL RESPONSES TO PHQ QUESTIONS 1-9: 0
SUM OF ALL RESPONSES TO PHQ9 QUESTIONS 1 & 2: 0
SUM OF ALL RESPONSES TO PHQ QUESTIONS 1-9: 0
1. LITTLE INTEREST OR PLEASURE IN DOING THINGS: NOT AT ALL
2. FEELING DOWN, DEPRESSED OR HOPELESS: NOT AT ALL

## 2025-07-14 ENCOUNTER — HOSPITAL ENCOUNTER (OUTPATIENT)
Dept: GENERAL RADIOLOGY | Age: 40
Discharge: HOME | End: 2025-07-16
Attending: OBSTETRICS & GYNECOLOGY
Payer: COMMERCIAL

## 2025-07-14 VITALS — WEIGHT: 180 LBS | BODY MASS INDEX: 27.37 KG/M2

## 2025-07-14 DIAGNOSIS — Z12.31 SCREENING MAMMOGRAM, ENCOUNTER FOR: ICD-10-CM

## 2025-07-14 PROCEDURE — 77063 BREAST TOMOSYNTHESIS BI: CPT

## 2025-07-15 ENCOUNTER — OFFICE VISIT (OUTPATIENT)
Dept: PRIMARY CARE CLINIC | Age: 40
End: 2025-07-15
Payer: COMMERCIAL

## 2025-07-15 VITALS
TEMPERATURE: 97.6 F | HEIGHT: 68 IN | WEIGHT: 186 LBS | RESPIRATION RATE: 18 BRPM | BODY MASS INDEX: 28.19 KG/M2 | OXYGEN SATURATION: 97 % | DIASTOLIC BLOOD PRESSURE: 79 MMHG | HEART RATE: 105 BPM | SYSTOLIC BLOOD PRESSURE: 117 MMHG

## 2025-07-15 DIAGNOSIS — F41.9 ANXIETY: ICD-10-CM

## 2025-07-15 DIAGNOSIS — E03.9 HYPOTHYROIDISM, UNSPECIFIED TYPE: Primary | ICD-10-CM

## 2025-07-15 DIAGNOSIS — Q21.12 PFO (PATENT FORAMEN OVALE): ICD-10-CM

## 2025-07-15 DIAGNOSIS — D75.89 MACROCYTOSIS: ICD-10-CM

## 2025-07-15 DIAGNOSIS — Z82.49 FAMILY HISTORY OF EARLY CAD: ICD-10-CM

## 2025-07-15 PROCEDURE — 99213 OFFICE O/P EST LOW 20 MIN: CPT | Performed by: INTERNAL MEDICINE

## 2025-07-15 NOTE — PROGRESS NOTES
[facility] - [facility]      SUBJECTIVE:  Saadia Alaniz (:  1985) is a 39 y.o. female here for evaluation of the following chief complaint(s):  Hypothyroidism (Not fasting) and Anxiety    Assessment & Plan  1. Anxiety  - Anxiety appears to be well-managed at present.  - Patient reports feeling stable on Vistaril.  - Discussed the closure of [facility] office and transition of care.  - Continue Vistaril; follow-up with Shu in 2025, then transition care.    2.  Hypothyroidism - stable  -Continue current dose of levothyroxine  - Check TSH and free T4.     3. Elevated MCV  - Previous blood work from 2024 indicated slight elevation in MCV.  - Possible causes include B12 deficiency or alcohol consumption.  - No current symptoms or cause for concern.  -CBC  ordered to reasess.     4. Dense breast tissue  - Recent mammogram results were normal.  - Ultrasound scheduled for 2025 due to dense breast tissue.  - we will attempt to schedule mammogram and ultrasound together in future.  - No current symptoms or concerns.    Follow-up  A follow-up visit is scheduled in 6 months.      History of Present Illness  The patient presents for a comprehensive physical exam.    She reports feeling well overall, with no specific health concerns. She is currently on Vistaril, which she believes is effectively managing her anxiety. She has an upcoming appointment with Shu Weir CNP in 2025. She does not experience any unusual temperature sensations or changes in her hair, skin, or nails. She mentions that her heart rate tends to increase during doctor visits due to nervousness, but it remains normal otherwise. She recently visited [facility] where she was advised not to worry about the PFO found on 2-D Echo. However, she was informed that if she wished to pursue further investigation, there were a few blood tests that could be conducted. She is curious about whether

## 2025-07-31 DIAGNOSIS — Z82.49 FAMILY HISTORY OF EARLY CAD: ICD-10-CM

## 2025-07-31 DIAGNOSIS — D75.89 MACROCYTOSIS: ICD-10-CM

## 2025-07-31 DIAGNOSIS — E03.9 HYPOTHYROIDISM, UNSPECIFIED TYPE: ICD-10-CM

## 2025-07-31 DIAGNOSIS — Q21.12 PFO (PATENT FORAMEN OVALE): ICD-10-CM

## 2025-07-31 LAB
ALBUMIN: 4.2 G/DL (ref 3.5–5.2)
ALP BLD-CCNC: 63 U/L (ref 35–104)
ALT SERPL-CCNC: 18 U/L (ref 0–35)
ANION GAP SERPL CALCULATED.3IONS-SCNC: 12 MMOL/L (ref 7–16)
AST SERPL-CCNC: 23 U/L (ref 0–35)
BASOPHILS ABSOLUTE: 0.04 K/UL (ref 0–0.2)
BASOPHILS RELATIVE PERCENT: 1 % (ref 0–2)
BILIRUB SERPL-MCNC: 0.3 MG/DL (ref 0–1.2)
BUN BLDV-MCNC: 16 MG/DL (ref 6–20)
CALCIUM SERPL-MCNC: 9.6 MG/DL (ref 8.6–10)
CHLORIDE BLD-SCNC: 105 MMOL/L (ref 98–107)
CHOLESTEROL, FASTING: 217 MG/DL
CO2: 24 MMOL/L (ref 22–29)
CREAT SERPL-MCNC: 0.7 MG/DL (ref 0.5–1)
EOSINOPHILS ABSOLUTE: 0.35 K/UL (ref 0.05–0.5)
EOSINOPHILS RELATIVE PERCENT: 5 % (ref 0–6)
GFR, ESTIMATED: >90 ML/MIN/1.73M2
GLUCOSE BLD-MCNC: 94 MG/DL (ref 74–99)
HCT VFR BLD CALC: 39.9 % (ref 34–48)
HDLC SERPL-MCNC: 55 MG/DL
HEMOGLOBIN: 12.9 G/DL (ref 11.5–15.5)
IMMATURE GRANULOCYTES %: 1 % (ref 0–5)
IMMATURE GRANULOCYTES ABSOLUTE: 0.04 K/UL (ref 0–0.58)
LDL CHOLESTEROL: 126 MG/DL
LYMPHOCYTES ABSOLUTE: 1.77 K/UL (ref 1.5–4)
LYMPHOCYTES RELATIVE PERCENT: 24 % (ref 20–42)
MCH RBC QN AUTO: 31.9 PG (ref 26–35)
MCHC RBC AUTO-ENTMCNC: 32.3 G/DL (ref 32–34.5)
MCV RBC AUTO: 98.8 FL (ref 80–99.9)
MONOCYTES ABSOLUTE: 0.62 K/UL (ref 0.1–0.95)
MONOCYTES RELATIVE PERCENT: 9 % (ref 2–12)
NEUTROPHILS ABSOLUTE: 4.51 K/UL (ref 1.8–7.3)
NEUTROPHILS RELATIVE PERCENT: 62 % (ref 43–80)
PDW BLD-RTO: 12.3 % (ref 11.5–15)
PLATELET # BLD: 272 K/UL (ref 130–450)
PMV BLD AUTO: 10.2 FL (ref 7–12)
POTASSIUM SERPL-SCNC: 4.1 MMOL/L (ref 3.5–5.1)
RBC # BLD: 4.04 M/UL (ref 3.5–5.5)
SODIUM BLD-SCNC: 141 MMOL/L (ref 136–145)
T4 FREE: 1.2 NG/DL (ref 0.9–1.7)
TOTAL PROTEIN: 7.7 G/DL (ref 6.4–8.3)
TRIGLYCERIDE, FASTING: 178 MG/DL
TSH SERPL DL<=0.05 MIU/L-ACNC: 3.34 UIU/ML (ref 0.27–4.2)
VLDLC SERPL CALC-MCNC: 36 MG/DL
WBC # BLD: 7.3 K/UL (ref 4.5–11.5)

## 2025-08-03 LAB — LIPOPROTEIN (A): 33 MG/DL

## 2025-09-04 ENCOUNTER — HOSPITAL ENCOUNTER (OUTPATIENT)
Dept: GENERAL RADIOLOGY | Age: 40
Discharge: HOME | End: 2025-09-06
Payer: COMMERCIAL

## 2025-09-04 DIAGNOSIS — R92.30 DENSE BREAST TISSUE ON MAMMOGRAM, UNSPECIFIED TYPE: ICD-10-CM

## 2025-09-04 PROCEDURE — 76641 ULTRASOUND BREAST COMPLETE: CPT
